# Patient Record
Sex: MALE | Race: WHITE | Employment: OTHER
[De-identification: names, ages, dates, MRNs, and addresses within clinical notes are randomized per-mention and may not be internally consistent; named-entity substitution may affect disease eponyms.]

---

## 2020-08-05 ENCOUNTER — HOSPITAL ENCOUNTER (OUTPATIENT)
Dept: GENERAL RADIOLOGY | Facility: CLINIC | Age: 60
Discharge: HOME | End: 2020-08-07
Payer: COMMERCIAL

## 2020-08-05 ENCOUNTER — HOSPITAL ENCOUNTER (OUTPATIENT)
Facility: CLINIC | Age: 60
Discharge: HOME | End: 2020-08-07
Payer: COMMERCIAL

## 2020-08-05 ENCOUNTER — OFFICE VISIT (OUTPATIENT)
Dept: INTERNAL MEDICINE CLINIC | Age: 60
End: 2020-08-05
Payer: COMMERCIAL

## 2020-08-05 VITALS
HEIGHT: 73 IN | BODY MASS INDEX: 33.53 KG/M2 | OXYGEN SATURATION: 96 % | DIASTOLIC BLOOD PRESSURE: 88 MMHG | SYSTOLIC BLOOD PRESSURE: 136 MMHG | HEART RATE: 75 BPM | WEIGHT: 253 LBS | TEMPERATURE: 98 F

## 2020-08-05 PROBLEM — Z87.891 EX-SMOKER: Status: ACTIVE | Noted: 2020-08-05

## 2020-08-05 PROBLEM — Z86.79 HISTORY OF UNSTABLE ANGINA: Status: ACTIVE | Noted: 2020-08-05

## 2020-08-05 PROBLEM — M79.18 MYOFASCIAL PAIN: Status: ACTIVE | Noted: 2020-08-05

## 2020-08-05 PROBLEM — E66.09 CLASS 1 OBESITY DUE TO EXCESS CALORIES WITHOUT SERIOUS COMORBIDITY WITH BODY MASS INDEX (BMI) OF 33.0 TO 33.9 IN ADULT: Status: ACTIVE | Noted: 2020-08-05

## 2020-08-05 PROBLEM — M25.511 ACUTE PAIN OF RIGHT SHOULDER: Status: ACTIVE | Noted: 2020-08-05

## 2020-08-05 PROCEDURE — 73030 X-RAY EXAM OF SHOULDER: CPT

## 2020-08-05 PROCEDURE — 72040 X-RAY EXAM NECK SPINE 2-3 VW: CPT

## 2020-08-05 PROCEDURE — 99204 OFFICE O/P NEW MOD 45 MIN: CPT | Performed by: INTERNAL MEDICINE

## 2020-08-05 RX ORDER — ATORVASTATIN CALCIUM 10 MG/1
10 TABLET, FILM COATED ORAL DAILY
COMMUNITY
End: 2020-08-05 | Stop reason: SDUPTHER

## 2020-08-05 RX ORDER — CYCLOBENZAPRINE HCL 10 MG
10 TABLET ORAL NIGHTLY PRN
Qty: 10 TABLET | Refills: 0 | Status: SHIPPED | OUTPATIENT
Start: 2020-08-05 | End: 2020-08-15

## 2020-08-05 RX ORDER — ASPIRIN 81 MG/1
81 TABLET ORAL DAILY
Qty: 90 TABLET | Refills: 1 | Status: SHIPPED | OUTPATIENT
Start: 2020-08-05 | End: 2020-11-03

## 2020-08-05 RX ORDER — DILTIAZEM HYDROCHLORIDE 120 MG/1
120 CAPSULE, EXTENDED RELEASE ORAL DAILY
COMMUNITY
End: 2020-10-05 | Stop reason: SDUPTHER

## 2020-08-05 RX ORDER — NAPROXEN 500 MG/1
500 TABLET ORAL 2 TIMES DAILY WITH MEALS
COMMUNITY
End: 2020-08-05

## 2020-08-05 RX ORDER — ATORVASTATIN CALCIUM 10 MG/1
10 TABLET, FILM COATED ORAL DAILY
Qty: 30 TABLET | Refills: 3 | Status: SHIPPED | OUTPATIENT
Start: 2020-08-05 | End: 2020-11-03 | Stop reason: SDUPTHER

## 2020-08-05 RX ORDER — PREDNISONE 20 MG/1
20 TABLET ORAL DAILY
Qty: 7 TABLET | Refills: 0 | Status: SHIPPED | OUTPATIENT
Start: 2020-08-05 | End: 2020-08-12

## 2020-08-05 ASSESSMENT — PATIENT HEALTH QUESTIONNAIRE - PHQ9
SUM OF ALL RESPONSES TO PHQ QUESTIONS 1-9: 0
1. LITTLE INTEREST OR PLEASURE IN DOING THINGS: 0
SUM OF ALL RESPONSES TO PHQ QUESTIONS 1-9: 0
2. FEELING DOWN, DEPRESSED OR HOPELESS: 0
SUM OF ALL RESPONSES TO PHQ9 QUESTIONS 1 & 2: 0

## 2020-08-05 ASSESSMENT — ENCOUNTER SYMPTOMS
TROUBLE SWALLOWING: 0
COUGH: 0
EYE DISCHARGE: 0
BLOOD IN STOOL: 0
WHEEZING: 0
SHORTNESS OF BREATH: 0
ABDOMINAL DISTENTION: 0
COLOR CHANGE: 0
EYE PAIN: 0
DIARRHEA: 0

## 2020-08-05 NOTE — PROGRESS NOTES
[address] [address]  Dept: [phone]  Dept Fax: [phone]    Baltazar Dorsey is a 61 y.o. male who presents today for his medical conditions/complaintsas noted below. Baltazar Dorsey is c/o of   Chief Complaint   Patient presents with    New Patient    Shoulder Pain     right shoulder          HPI:     New pt  To town  Right shooudler hand scapula pain  For aw week  Throbbing  Worse  No relation to movenet of arm or shoulder  No hx of neck or shoulder issues  Right handed     HLD  Onset more than 5 years ago  Severity is mild, not getting worse  Not associated with pancreatitis  Tolerating statin well no muscle pain        No results found for: LABA1C          ( goal A1Cis < 7)   No results found for: LABMICR  No results found for: LDLCHOLESTEROL, LDLCALC    (goal LDL is <100)   No results found for: AST, ALT, BUN  BP Readings from Last 3 Encounters:   08/05/20 136/88          (goal 120/80)    No past medical history on file. No past surgical history on file. No family history on file. Social History     Tobacco Use    Smoking status: Never Smoker    Smokeless tobacco: Never Used   Substance Use Topics    Alcohol use: Yes      Current Outpatient Medications   Medication Sig Dispense Refill    dilTIAZem (DILT-XR) 120 MG extended release capsule Take 120 mg by mouth daily      atorvastatin (LIPITOR) 10 MG tablet Take 1 tablet by mouth daily 30 tablet 3    aspirin EC 81 MG EC tablet Take 1 tablet by mouth daily 90 tablet 1    predniSONE (DELTASONE) 20 MG tablet Take 1 tablet by mouth daily for 7 days 7 tablet 0    cyclobenzaprine (FLEXERIL) 10 MG tablet Take 1 tablet by mouth nightly as needed for Muscle spasms 10 tablet 0     No current facility-administered medications for this visit.       No Known Allergies    Health Maintenance   Topic Date Due    Hepatitis C screen  1960    Lipid screen  07/22/1970    HIV screen  07/22/1975    DTaP/Tdap/Td vaccine (1 - Tdap) 07/22/1979    Diabetes screen  07/22/2000    Shingles Vaccine (1 of 2) 07/22/2010    Colon cancer screen colonoscopy  07/22/2010    Flu vaccine (1) 09/01/2020    Hepatitis A vaccine  Aged Out    Hepatitis B vaccine  Aged Out    Hib vaccine  Aged Out    Meningococcal (ACWY) vaccine  Aged Out    Pneumococcal 0-64 years Vaccine  Aged Out       Subjective:     Review of Systems   Constitutional: Negative for appetite change, diaphoresis and fatigue. HENT: Negative for ear discharge and trouble swallowing. Eyes: Negative for pain and discharge. Respiratory: Negative for cough, shortness of breath and wheezing. Cardiovascular: Negative for chest pain and palpitations. Gastrointestinal: Negative for abdominal distention, blood in stool and diarrhea. Endocrine: Negative for polydipsia and polyphagia. Genitourinary: Negative for difficulty urinating and frequency. Musculoskeletal: Positive for arthralgias, myalgias and neck pain. Negative for gait problem. Skin: Negative for color change and rash. Allergic/Immunologic: Negative for environmental allergies and food allergies. Neurological: Negative for dizziness and headaches. Hematological: Negative for adenopathy. Does not bruise/bleed easily. Psychiatric/Behavioral: Negative for behavioral problems and sleep disturbance. Objective:     Physical Exam  Constitutional:       Appearance: He is well-developed. He is not diaphoretic. HENT:      Head: Normocephalic and atraumatic. Eyes:      General:         Right eye: No discharge. Left eye: No discharge. Extraocular Movements:      Right eye: Normal extraocular motion. Left eye: Normal extraocular motion. Conjunctiva/sclera: Conjunctivae normal.      Right eye: Right conjunctiva is not injected. Left eye: Left conjunctiva is not injected.    Neck:      Musculoskeletal: Normal range of motion and neck with body mass index (BMI) of 33.0 to 33.9 in adult     4. History of unstable angina  Lipid, Fasting    Comprehensive Metabolic Panel    CBC With Auto Differential   5. Ex-smoker  CT lung screen [Initial/Annual]   6. Myofascial pain               Plan:      Return in about 2 weeks (around 8/19/2020). Orders Placed This Encounter   Procedures    XR CERVICAL SPINE (2-3 VIEWS)     Standing Status:   Future     Standing Expiration Date:   8/5/2021     Order Specific Question:   Reason for exam:     Answer:   neck pain    XR SHOULDER RIGHT (MIN 2 VIEWS)     Standing Status:   Future     Standing Expiration Date:   8/5/2021     Order Specific Question:   Reason for exam:     Answer:   shoulder neck pain    CT lung screen [Initial/Annual]     Age: 61 y.o. Smoking History:   Social History    Tobacco Use      Smoking status: Never Smoker      Smokeless tobacco: Never Used    Alcohol use: Yes    Drug use: Never    Pack years: 0  Last CT lung screen: [unfilled]     Standing Status:   Future     Standing Expiration Date:   9/5/2020     Order Specific Question:   Is there documentation of shared decision making? Answer:   Yes     Order Specific Question:   Does the patient show any signs or symptoms of lung cancer? Answer:   No     Order Specific Question:   Is this the first (baseline) CT or an annual exam?     Answer:   Baseline [1]     Order Specific Question:   Is this a low dose CT or a routine CT? Answer:   Low Dose CT [1]     Order Specific Question:   Smoking Status? Answer: Former Smoker [4]     Order Specific Question:   Date quit smoking? (must be within 15 years)     Answer:   8/5/2010     Order Specific Question:   Smoking packs per day? Answer:   1     Order Specific Question:   Years smoking?      Answer:   15    Lipid, Fasting     Standing Status:   Future     Standing Expiration Date:   8/4/2021    Comprehensive Metabolic Panel     Standing Status:   Future     Standing Expiration

## 2020-08-06 LAB
ABSOLUTE BASO #: 0 X10E9/L (ref 0–0.2)
ABSOLUTE EOS #: 0.2 X10E9/L (ref 0–0.4)
ABSOLUTE LYMPH #: 1.7 X10E9/L (ref 1–3.5)
ABSOLUTE MONO #: 0.8 X10E9/L (ref 0–0.9)
ABSOLUTE NEUT #: 3.6 X10E9/L (ref 1.5–6.6)
ALBUMIN SERPL-MCNC: 4.4 G/DL
ALBUMIN SERPL-MCNC: 4.4 G/DL (ref 3.2–5.3)
ALK PHOSPHATASE: 141 U/L (ref 39–130)
ALP BLD-CCNC: 141 U/L
ALT SERPL-CCNC: 36 U/L
ALT SERPL-CCNC: 36 U/L (ref 0–40)
ANION GAP SERPL CALCULATED.3IONS-SCNC: 11 MMOL/L
ANION GAP SERPL CALCULATED.3IONS-SCNC: 11 MMOL/L (ref 5–15)
AST SERPL-CCNC: 27 U/L
AST SERPL-CCNC: 27 U/L (ref 0–41)
BASOPHILS ABSOLUTE: 0 /ΜL
BASOPHILS RELATIVE PERCENT: 0.6 %
BASOPHILS RELATIVE PERCENT: 0.6 %
BILIRUB SERPL-MCNC: 0.6 MG/DL (ref 0.1–1.4)
BILIRUB SERPL-MCNC: 0.6 MG/DL (ref 0.3–1.2)
BUN BLDV-MCNC: 21 MG/DL
BUN BLDV-MCNC: 21 MG/DL (ref 5–23)
CALCIUM SERPL-MCNC: 9.2 MG/DL
CALCIUM SERPL-MCNC: 9.2 MG/DL (ref 8.5–10.5)
CHLORIDE BLD-SCNC: 106 MMOL/L
CHLORIDE BLD-SCNC: 106 MMOL/L (ref 98–109)
CHOLESTEROL, FASTING: 186
CHOLESTEROL/HDL RATIO: 3.8 (ref 1–5)
CHOLESTEROL: 186 MG/DL (ref 150–200)
CO2: 23 MMOL/L
CO2: 23 MMOL/L (ref 22–32)
CREAT SERPL-MCNC: 1.07 MG/DL
CREAT SERPL-MCNC: 1.07 MG/DL (ref 0.6–1.3)
EGFR AFRICAN AMERICAN: >60 ML/MIN/1.73SQ.M
EGFR IF NONAFRICAN AMERICAN: >60 ML/MIN/1.73SQ.M
EOSINOPHILS ABSOLUTE: 0.2 /ΜL
EOSINOPHILS RELATIVE PERCENT: 3 %
EOSINOPHILS RELATIVE PERCENT: 3 %
GFR CALCULATED: ABNORMAL
GLUCOSE BLD-MCNC: 100 MG/DL
GLUCOSE: 100 MG/DL (ref 65–99)
HCT VFR BLD CALC: 46 % (ref 39–49)
HCT VFR BLD CALC: 46 % (ref 41–53)
HDLC SERPL-MCNC: 49 MG/DL
HDLC SERPL-MCNC: 49 MG/DL (ref 35–70)
HEMOGLOBIN: 16 G/DL (ref 13.5–17.5)
HEMOGLOBIN: 16 G/DL (ref 13–17)
LDL CHOLESTEROL CALCULATED: 100 MG/DL
LDL CHOLESTEROL CALCULATED: 100 MG/DL (ref 0–160)
LDL/HDL RATIO: 2
LYMPHOCYTE %: 27.5 %
LYMPHOCYTES ABSOLUTE: 1.7 /ΜL
LYMPHOCYTES RELATIVE PERCENT: 27.5 %
MCH RBC QN AUTO: 32.9 PG
MCH RBC QN AUTO: 32.9 PG (ref 27–34)
MCHC RBC AUTO-ENTMCNC: 34.9 G/DL
MCHC RBC AUTO-ENTMCNC: 34.9 G/DL (ref 32–36)
MCV RBC AUTO: 94 FL
MCV RBC AUTO: 94 FL (ref 80–100)
MONOCYTES # BLD: 11.9 %
MONOCYTES ABSOLUTE: 0.8 /ΜL
MONOCYTES RELATIVE PERCENT: 11.9 %
NEUTROPHILS ABSOLUTE: 3.6 /ΜL
NEUTROPHILS RELATIVE PERCENT: 57 %
NEUTROPHILS RELATIVE PERCENT: 57 %
PDW BLD-RTO: 13.5 %
PDW BLD-RTO: 13.5 % (ref 11.5–15)
PLATELET # BLD: 270 K/ΜL
PLATELETS: 270 X10E9/L (ref 150–450)
PMV BLD AUTO: 8.3 FL
PMV BLD AUTO: 8.3 FL (ref 7–12)
POTASSIUM SERPL-SCNC: 4.2 MMOL/L
POTASSIUM SERPL-SCNC: 4.2 MMOL/L (ref 3.5–5)
RBC # BLD: 4.88 10^6/ΜL
RBC: 4.88 X10E12/L (ref 4.1–5.7)
SODIUM BLD-SCNC: 140 MMOL/L
SODIUM BLD-SCNC: 140 MMOL/L (ref 134–146)
TOTAL PROTEIN: 7.5
TOTAL PROTEIN: 7.5 G/DL (ref 6–8)
TRIGL SERPL-MCNC: 185 MG/DL (ref 27–150)
TRIGLYCERIDE, FASTING: 185
VLDLC SERPL CALC-MCNC: 37 MG/DL (ref 0–30)
WBC # BLD: 6.4 10^3/ML
WBC: 6.4 X10E9/L (ref 4–11)

## 2020-08-11 ENCOUNTER — HOSPITAL ENCOUNTER (OUTPATIENT)
Dept: PHYSICAL THERAPY | Facility: CLINIC | Age: 60
Setting detail: THERAPIES SERIES
Discharge: HOME | End: 2020-08-11
Payer: COMMERCIAL

## 2020-08-11 PROCEDURE — 97110 THERAPEUTIC EXERCISES: CPT

## 2020-08-11 PROCEDURE — 97161 PT EVAL LOW COMPLEX 20 MIN: CPT

## 2020-08-11 PROCEDURE — 97140 MANUAL THERAPY 1/> REGIONS: CPT

## 2020-08-11 NOTE — CONSULTS
[] [facility] &  Therapy  [address]  [phone]  F: [phone] [] [address]  [facility]   [address]  P: [phone]  F: [phone] [x] [address] &  Therapy  [address] [facility]  P: [phone]  F: [phone] [] [address]  [facility]   [address]   [place]: [phone]  F: [phone]      Physical Therapy Upper Extremity Evaluation    Date:  2020  Patient: Baltazar Dorsey  : 1960  MRN: [id number]  Physician: Dr. Lexi Leblanc: Irene Sepulveda (unlimited visits) Recheck insurance verification   Medical Diagnosis: Acute pain R shoulder  Rehab Codes: M25.511  Onset Date: ~20   Next 's appt: TBA    Subjective:   CC: Pt arrives stating that pain is completing gone currently. Pain did last over a week, sharp pain in posterior shoulder causing numbness down into R arm. PCP took an xray and stated that pain was originating from neck. Currently taking prenisdone  HPI: (onset date)    PMHx: [] Unremarkable [] Diabetes [] HTN  [] Pacemaker   [] MI/Heart Problems [] Cancer [] Arthritis [x] Other:              [] Refer to full medical chart  In EPIC       Comorbidities:   [] Obesity [] Dialysis  [] Other:   [] Asthma/COPD [] Dementia [] Other:   [] Stroke [] Sleep apnea [] Other:   [] Vascular disease [] Rheumatic disease [] Other:     Tests: [x] X-Ray:   Multilevel degenerative disc disease in the cervical spine with multilevel    facet arthropathy.         No acute osseous abnormality of the right shoulder.  AC degenerative changes. [] MRI:  [] Other:    Medications: [x] Refer to full medical record [] None [] Other:  Allergies:      [x] Refer to full medical record [] None [] Other:    Function:  Hand Dominance  [] Right  [] Left  Patient lives with:     In allergic. Frequency: 2 x/week for 10 visits    Todays Treatment:    Exercise  R shoulder, upper back pain Reps/ Time Weight/ Level Comments   UBE            *UT stretch 3x30\"     *Levator stretch 3x30\"     *Mid back stretch 3x30\"     *Foam roller upper back stretch x     Other:  *HEP    Manual: DI to R levator  FRSL C4/C5- MET    Specific Instructions for next treatment: Pt will continue HEP for 2 weeks d/t no pain, and will be reassessed at two week erika to see if continued visits are needed. Evaluation Complexity:  History (Personal factors, comorbidities) [x] 0 [] 1-2 [] 3+   Exam (limitations, restrictions) [x] 1-2 [] 3 [] 4+   Clinical presentation (progression) [x] Stable [] Evolving  [] Unstable   Decision Making [x] Low [] Moderate [] High    [x] Low Complexity [] Moderate Complexity [] High Complexity       Treatment Charges: Mins Units   [x] Evaluation       [x]  Low       []  Moderate       []  High 20 1   []  Modalities     [x]  Ther Exercise 15 1   [x]  Manual Therapy 15 1   []  Ther Activities     []  Aquatics     []  Vasocompression     []  Other       TOTAL TREATMENT TIME: 50 minutes    Time in: 1100   Time Out: 1150    Electronically signed by: Shen Hanley PT        Physician Signature:________________________________Date:__________________  By signing above or cosigning this note, I have reviewed this plan of care and certify a need for medically necessary rehabilitation services.      *PLEASE SIGN ABOVE AND FAX BACK ALL PAGES*

## 2020-08-19 ENCOUNTER — OFFICE VISIT (OUTPATIENT)
Dept: INTERNAL MEDICINE CLINIC | Age: 60
End: 2020-08-19
Payer: COMMERCIAL

## 2020-08-19 VITALS
HEART RATE: 73 BPM | BODY MASS INDEX: 33.4 KG/M2 | OXYGEN SATURATION: 96 % | HEIGHT: 73 IN | DIASTOLIC BLOOD PRESSURE: 80 MMHG | WEIGHT: 252 LBS | SYSTOLIC BLOOD PRESSURE: 136 MMHG | TEMPERATURE: 98.2 F

## 2020-08-19 PROCEDURE — 99214 OFFICE O/P EST MOD 30 MIN: CPT | Performed by: INTERNAL MEDICINE

## 2020-08-19 ASSESSMENT — ENCOUNTER SYMPTOMS
EYE DISCHARGE: 0
DIARRHEA: 0
WHEEZING: 0
BLOOD IN STOOL: 0
COUGH: 0
COLOR CHANGE: 0
ABDOMINAL DISTENTION: 0
SHORTNESS OF BREATH: 0
TROUBLE SWALLOWING: 0
EYE PAIN: 0
BACK PAIN: 1

## 2020-08-19 NOTE — PROGRESS NOTES
[address] [address]  Dept: [phone]  Dept Fax: [phone]    Natalia Crouch is a 61 y.o. male who presents today for his medical conditions/complaintsas noted below. Natalia Crouch is c/o of   Chief Complaint   Patient presents with    Shoulder Pain     right shoulder pain follow up          HPI:     sholder pain back pain is better          No results found for: LABA1C          ( goal A1Cis < 7)   No results found for: LABMICR  LDL Calculated (mg/dL)   Date Value   08/06/2020 100   08/06/2020 100       (goal LDL is <100)   AST (U/L)   Date Value   08/06/2020 27     ALT (U/L)   Date Value   08/06/2020 36     BUN (mg/dL)   Date Value   08/06/2020 21     BP Readings from Last 3 Encounters:   08/19/20 136/80   08/05/20 136/88          (goal 120/80)    No past medical history on file. No past surgical history on file. No family history on file. Social History     Tobacco Use    Smoking status: Never Smoker    Smokeless tobacco: Never Used   Substance Use Topics    Alcohol use: Yes      Current Outpatient Medications   Medication Sig Dispense Refill    dilTIAZem (DILT-XR) 120 MG extended release capsule Take 120 mg by mouth daily      atorvastatin (LIPITOR) 10 MG tablet Take 1 tablet by mouth daily 30 tablet 3    aspirin EC 81 MG EC tablet Take 1 tablet by mouth daily 90 tablet 1     No current facility-administered medications for this visit.       No Known Allergies    Health Maintenance   Topic Date Due    Hepatitis C screen  1960    HIV screen  07/22/1975    DTaP/Tdap/Td vaccine (1 - Tdap) 07/22/1979    Shingles Vaccine (1 of 2) 07/22/2010    Colon cancer screen colonoscopy  07/22/2010    Flu vaccine (1) 09/01/2020    Lipid screen  08/06/2021    Hepatitis A vaccine  Aged Out    Hepatitis B vaccine  Aged Out    Hib vaccine  Aged Out    Meningococcal (ACWY) vaccine  Aged Out    Pneumococcal 0-64 years Vaccine  Aged Harrells orders of the defined types were placed in this encounter. cdt lugn screen pending  shoudler and back is better     Patient given educational materials - see patient instructions. Discussed use, benefit, and side effects of prescribed medications. All patientquestions answered. Pt voiced understanding. Reviewed health maintenance. Instructedto continue current medications, diet and exercise. Patient agreed with treatmentplan. Follow up as directed.      Electronically signed by Freddy Vargas MD on 8/19/2020 at 11:04 AM

## 2020-08-24 ENCOUNTER — TELEPHONE (OUTPATIENT)
Dept: INTERNAL MEDICINE CLINIC | Age: 60
End: 2020-08-24

## 2020-08-24 ENCOUNTER — HOSPITAL ENCOUNTER (OUTPATIENT)
Dept: PHYSICAL THERAPY | Facility: CLINIC | Age: 60
Setting detail: THERAPIES SERIES
Discharge: HOME | End: 2020-08-24
Payer: COMMERCIAL

## 2020-08-24 PROCEDURE — 97110 THERAPEUTIC EXERCISES: CPT

## 2020-08-24 PROCEDURE — 97140 MANUAL THERAPY 1/> REGIONS: CPT

## 2020-08-24 NOTE — FLOWSHEET NOTE
[]  Modalities     [x]  Ther Exercise 15 1   [x]  Manual Therapy 15 1   []  Ther Activities     []  Aquatics     []  Vasocompression     []  Other     Total Treatment time 30 2       Assessment: [x] Progressing toward goals. Pt able to demonstrate proper HEP, as well as remains painfree throughout. [] No change. [] Other:    Patient would benefit from skilled physical therapy services in order to:  Decrease neck and shoulder tightness     Problems:    [x]? ? Pain:  [x]? ? ROM:  [x]? ? Strength:  [x]? ? Function:  []? Other:       STG: (to be met in 10 treatments)  1. ? Pain: Pt to have continued 0/10 pain MET  2. ? Function: Pt to report sleeping through the night without  pain MET  3. Patient to be independent with home exercise program as demonstrated by performance with correct form without cues. MET    Pt. Education:  [x] Yes  [] No  [] Reviewed Prior HEP/Ed  Method of Education: [x] Verbal  [x] Demo  [x] Written  Comprehension of Education:  [x] Verbalizes understanding. [x] Demonstrates understanding. [x] Needs review. [] Demonstrates/verbalizes HEP/Ed previously given. Plan: [x] Pt to continue as HEP, will keep chart open until 9/24 in case pt's symptom's reoccur. If pt does not call during that time will DC chart.          Time In: 1100            Time Out: 1135    Electronically signed by:  Marcelino Mullins, PT

## 2020-08-24 NOTE — TELEPHONE ENCOUNTER
Radiology called and stated that patient doesn't qualify for ct lung screening for smoking because he didn't smoke for 30 years. She said you can order a ct chest low dose diagnostic and that would be covered.

## 2020-09-01 ENCOUNTER — HOSPITAL ENCOUNTER (OUTPATIENT)
Dept: CT IMAGING | Age: 60
Discharge: HOME | End: 2020-09-03
Payer: COMMERCIAL

## 2020-09-01 PROCEDURE — 71250 CT THORAX DX C-: CPT

## 2020-10-05 RX ORDER — DILTIAZEM HYDROCHLORIDE 120 MG/1
120 CAPSULE, EXTENDED RELEASE ORAL 3 TIMES DAILY
Qty: 90 CAPSULE | Refills: 3 | Status: SHIPPED | OUTPATIENT
Start: 2020-10-05 | End: 2021-01-21 | Stop reason: SDUPTHER

## 2020-10-05 NOTE — TELEPHONE ENCOUNTER
Medication: Diiltiazem  Last visit: 8/19/20  Next visit: 11/19/2020  Last refill: Not filled by our office before  Pharmacy: [address] [place] [address]

## 2020-11-03 RX ORDER — ATORVASTATIN CALCIUM 10 MG/1
10 TABLET, FILM COATED ORAL DAILY
Qty: 30 TABLET | Refills: 3 | Status: SHIPPED | OUTPATIENT
Start: 2020-11-03 | End: 2021-04-08

## 2020-11-03 RX ORDER — HYDROGEN PEROXIDE 2.65 ML/100ML
LIQUID ORAL; TOPICAL
Qty: 90 TABLET | Refills: 0 | Status: SHIPPED | OUTPATIENT
Start: 2020-11-03 | End: 2021-01-21 | Stop reason: SDUPTHER

## 2020-11-03 NOTE — TELEPHONE ENCOUNTER
Pt would like a refill on the two pending medications and an additional medication that is not listed in the med review. Pt says he asked his doctor to fill it for him the last time he was seen, but it was not sent over to his pharmacy.      Last OV: 8/19  Next OV: 11/19    Pt contact: [phone]

## 2021-01-21 RX ORDER — DILTIAZEM HYDROCHLORIDE 120 MG/1
120 CAPSULE, EXTENDED RELEASE ORAL 3 TIMES DAILY
Qty: 90 CAPSULE | Refills: 3 | Status: SHIPPED | OUTPATIENT
Start: 2021-01-21 | End: 2021-06-16 | Stop reason: SDUPTHER

## 2021-01-21 RX ORDER — ASPIRIN 81 MG/1
81 TABLET ORAL DAILY
Qty: 90 TABLET | Refills: 0 | Status: SHIPPED | OUTPATIENT
Start: 2021-01-21 | End: 2022-04-01

## 2021-04-08 RX ORDER — ATORVASTATIN CALCIUM 10 MG/1
TABLET, FILM COATED ORAL
Qty: 30 TABLET | Refills: 0 | Status: ON HOLD | OUTPATIENT
Start: 2021-04-08 | End: 2021-11-09 | Stop reason: HOSPADM

## 2021-06-14 RX ORDER — NAPROXEN 500 MG/1
500 TABLET ORAL 2 TIMES DAILY WITH MEALS
Qty: 60 TABLET | Refills: 1 | OUTPATIENT
Start: 2021-06-14

## 2021-06-14 RX ORDER — DILTIAZEM HYDROCHLORIDE 120 MG/1
120 CAPSULE, EXTENDED RELEASE ORAL 3 TIMES DAILY
Qty: 90 CAPSULE | Refills: 3 | OUTPATIENT
Start: 2021-06-14 | End: 2021-07-14

## 2021-06-14 RX ORDER — CYCLOBENZAPRINE HCL 10 MG
10 TABLET ORAL NIGHTLY PRN
Qty: 10 TABLET | Refills: 0 | Status: CANCELLED | OUTPATIENT
Start: 2021-06-14 | End: 2021-06-24

## 2021-06-16 ENCOUNTER — OFFICE VISIT (OUTPATIENT)
Dept: INTERNAL MEDICINE CLINIC | Age: 61
End: 2021-06-16
Payer: COMMERCIAL

## 2021-06-16 VITALS
BODY MASS INDEX: 33.53 KG/M2 | DIASTOLIC BLOOD PRESSURE: 88 MMHG | HEIGHT: 73 IN | WEIGHT: 253 LBS | HEART RATE: 81 BPM | SYSTOLIC BLOOD PRESSURE: 138 MMHG | OXYGEN SATURATION: 98 %

## 2021-06-16 DIAGNOSIS — Z12.11 SCREENING FOR MALIGNANT NEOPLASM OF COLON: ICD-10-CM

## 2021-06-16 DIAGNOSIS — Z86.79 HISTORY OF UNSTABLE ANGINA: ICD-10-CM

## 2021-06-16 DIAGNOSIS — E78.2 MIXED HYPERLIPIDEMIA: Primary | ICD-10-CM

## 2021-06-16 DIAGNOSIS — M54.50 CHRONIC MIDLINE LOW BACK PAIN WITHOUT SCIATICA: ICD-10-CM

## 2021-06-16 DIAGNOSIS — Z87.891 EX-SMOKER: ICD-10-CM

## 2021-06-16 DIAGNOSIS — G89.29 CHRONIC MIDLINE LOW BACK PAIN WITHOUT SCIATICA: ICD-10-CM

## 2021-06-16 PROBLEM — M54.9 CHRONIC BACK PAIN: Status: ACTIVE | Noted: 2021-06-16

## 2021-06-16 PROCEDURE — 99214 OFFICE O/P EST MOD 30 MIN: CPT | Performed by: INTERNAL MEDICINE

## 2021-06-16 RX ORDER — DILTIAZEM HYDROCHLORIDE 120 MG/1
120 CAPSULE, EXTENDED RELEASE ORAL 3 TIMES DAILY
Qty: 90 CAPSULE | Refills: 3 | Status: SHIPPED | OUTPATIENT
Start: 2021-06-16 | End: 2021-10-13 | Stop reason: SDUPTHER

## 2021-06-16 RX ORDER — NAPROXEN 500 MG/1
500 TABLET ORAL 2 TIMES DAILY PRN
Qty: 60 TABLET | Refills: 0 | Status: SHIPPED | OUTPATIENT
Start: 2021-06-16 | End: 2021-11-16

## 2021-06-16 ASSESSMENT — PATIENT HEALTH QUESTIONNAIRE - PHQ9
SUM OF ALL RESPONSES TO PHQ QUESTIONS 1-9: 0
SUM OF ALL RESPONSES TO PHQ QUESTIONS 1-9: 0
2. FEELING DOWN, DEPRESSED OR HOPELESS: 0
1. LITTLE INTEREST OR PLEASURE IN DOING THINGS: 0
SUM OF ALL RESPONSES TO PHQ QUESTIONS 1-9: 0
SUM OF ALL RESPONSES TO PHQ9 QUESTIONS 1 & 2: 0

## 2021-06-16 ASSESSMENT — ENCOUNTER SYMPTOMS
EYE PAIN: 0
DIARRHEA: 0
SHORTNESS OF BREATH: 0
WHEEZING: 0
ABDOMINAL DISTENTION: 0
EYE DISCHARGE: 0
TROUBLE SWALLOWING: 0
BLOOD IN STOOL: 0
COLOR CHANGE: 0
BACK PAIN: 1
COUGH: 0

## 2021-06-16 NOTE — PROGRESS NOTES
Visit Information    Have you changed or started any medications since your last visit including any over-the-counter medicines, vitamins, or herbal medicines? no   Are you having any side effects from any of your medications? -  no  Have you stopped taking any of your medications? Is so, why? -  no    Have you seen any other physician or provider since your last visit? No  Have you had any other diagnostic tests since your last visit? No  Have you been seen in the emergency room and/or had an admission to a hospital since we last saw you? No  Have you had your routine dental cleaning in the past 6 months? yes -     Have you activated your [employer] account? If not, what are your barriers?  Yes     Patient Care Team:  Rosemary Vila MD as PCP - General (Internal Medicine)  Rosemary Vila MD as PCP - [facility]    Medical History Review  Past Medical, Family, and Social History reviewed and does not contribute to the patient presenting condition    Health Maintenance   Topic Date Due    Hepatitis C screen  Never done    COVID-19 Vaccine (1) Never done    HIV screen  Never done    DTaP/Tdap/Td vaccine (1 - Tdap) Never done    Shingles Vaccine (1 of 2) Never done    Colon cancer screen colonoscopy  Never done    Lipid screen  08/06/2021    Flu vaccine (Season Ended) 09/01/2021    Hepatitis A vaccine  Aged Out    Hepatitis B vaccine  Aged Out    Hib vaccine  Aged Out    Meningococcal (ACWY) vaccine  Aged Out    Pneumococcal 0-64 years Vaccine  Aged Out

## 2021-06-16 NOTE — PROGRESS NOTES
[address] [address]  Dept: [phone]  Dept Fax: [phone]    Angelica Palomo is a 61 y.o. male who presents today for his medical conditions/complaintsas noted below. Angelica Palomo is c/o of   Chief Complaint   Patient presents with    Medication Refill    Back Pain         HPI:     HLD  Onset more than 5 years ago  Severity is mild, not getting worse  Not associated with pancreatitis  Tolerating statin well no muscle pain  Back pain  Onset more than 1year ago  Severity is moderate to severe  Not associated wt lossbut associated with radiation of pain on the legs  No bowel bladder incontinence   Aggravated with bending and better with pain meds and rest.  Controlled with current pain meds. No results found for: LABA1C          ( goal A1Cis < 7)   No results found for: LABMICR  LDL Calculated (mg/dL)   Date Value   08/06/2020 100   08/06/2020 100       (goal LDL is <100)   AST (U/L)   Date Value   08/06/2020 27     ALT (U/L)   Date Value   08/06/2020 36     BUN (mg/dL)   Date Value   08/06/2020 21     BP Readings from Last 3 Encounters:   06/16/21 138/88   08/19/20 136/80   08/05/20 136/88          (goal 120/80)    No past medical history on file. No past surgical history on file. No family history on file.     Social History     Tobacco Use    Smoking status: Never Smoker    Smokeless tobacco: Never Used   Substance Use Topics    Alcohol use: Yes      Current Outpatient Medications   Medication Sig Dispense Refill    dilTIAZem (DILT-XR) 120 MG extended release capsule Take 1 capsule by mouth three times daily 90 capsule 3    naproxen (NAPROSYN) 500 MG tablet Take 1 tablet by mouth 2 times daily as needed for Pain 60 tablet 0    aspirin (EQ ASPIRIN ADULT LOW DOSE) 81 MG EC tablet Take 1 tablet by mouth daily 90 tablet 0    atorvastatin (LIPITOR) 10 MG tablet Take 1 tablet by mouth once daily (Patient not taking: Reported on 6/16/2021) 30 tablet 0     No current facility-administered medications for this visit. No Known Allergies    Health Maintenance   Topic Date Due    Hepatitis C screen  Never done    COVID-19 Vaccine (1) Never done    HIV screen  Never done    DTaP/Tdap/Td vaccine (1 - Tdap) Never done    Shingles Vaccine (1 of 2) Never done    Colon cancer screen colonoscopy  Never done    Lipid screen  08/06/2021    Flu vaccine (Season Ended) 09/01/2021    Hepatitis A vaccine  Aged Out    Hepatitis B vaccine  Aged Out    Hib vaccine  Aged Out    Meningococcal (ACWY) vaccine  Aged Out    Pneumococcal 0-64 years Vaccine  Aged Out       Subjective:     Review of Systems   Constitutional: Negative for appetite change, diaphoresis and fatigue. HENT: Negative for ear discharge and trouble swallowing. Eyes: Negative for pain and discharge. Respiratory: Negative for cough, shortness of breath and wheezing. Cardiovascular: Negative for chest pain and palpitations. Gastrointestinal: Negative for abdominal distention, blood in stool and diarrhea. Endocrine: Negative for polydipsia and polyphagia. Genitourinary: Negative for difficulty urinating and frequency. Musculoskeletal: Positive for arthralgias and back pain. Negative for gait problem, myalgias and neck pain. Skin: Negative for color change and rash. Allergic/Immunologic: Negative for environmental allergies and food allergies. Neurological: Negative for dizziness and headaches. Hematological: Negative for adenopathy. Does not bruise/bleed easily. Psychiatric/Behavioral: Negative for behavioral problems and sleep disturbance. Objective:     Physical Exam  Constitutional:       Appearance: He is well-developed. He is not diaphoretic. HENT:      Head: Normocephalic and atraumatic. Eyes:      General:         Right eye: No discharge. Left eye: No discharge. Extraocular Movements:      Right eye: Normal extraocular motion. Left eye: Normal extraocular motion. Conjunctiva/sclera: Conjunctivae normal.      Right eye: Right conjunctiva is not injected. Left eye: Left conjunctiva is not injected. Neck:      Thyroid: No thyroid mass or thyromegaly. Vascular: No JVD. Cardiovascular:      Rate and Rhythm: Normal rate and regular rhythm. Heart sounds: No murmur heard. No friction rub. Pulmonary:      Effort: Pulmonary effort is normal. No tachypnea, bradypnea, accessory muscle usage or respiratory distress. Breath sounds: Normal breath sounds. No wheezing or rales. Abdominal:      General: Bowel sounds are normal. There is no distension. Palpations: Abdomen is soft. Tenderness: There is no abdominal tenderness. There is no rebound. Musculoskeletal:         General: No tenderness. Normal range of motion. Cervical back: Normal range of motion and neck supple. No edema or erythema. Lymphadenopathy:      Head:      Right side of head: No submental or submandibular adenopathy. Left side of head: No submental or submandibular adenopathy. Cervical: No cervical adenopathy. Skin:     General: Skin is warm. Coloration: Skin is not pale. Findings: No bruising, ecchymosis or rash. Neurological:      Mental Status: He is alert and oriented to person, place, and time. Cranial Nerves: No cranial nerve deficit. Sensory: No sensory deficit. Motor: No atrophy or abnormal muscle tone. Coordination: Coordination normal.   Psychiatric:         Mood and Affect: Mood is not anxious. Affect is not angry. Speech: Speech is not slurred. Behavior: Behavior normal. Behavior is not aggressive. Thought Content: Thought content does not include homicidal ideation. Cognition and Memory: Memory is not impaired.        /88   Pulse 81   Ht 6' 1\" (1.854 m)   Wt 253 lb (114.8 kg)   SpO2 98%   BMI 33.38 kg/m²     Assessment: Diagnosis Orders   1. Mixed hyperlipidemia  Lipid Panel    Hemoglobin A1C    Comprehensive Metabolic Panel    CBC Auto Differential   2. Screening for malignant neoplasm of colon  Cologuard (For External Results Only)   3. Ex-smoker     4. History of unstable angina  Hemoglobin A1C   5. Chronic midline low back pain without sciatica  Ambulatory referral to Physical Therapy             Plan:      No follow-ups on file. Orders Placed This Encounter   Procedures    Cologuard (For External Results Only)     This test is performed by an external laboratory and is used for result attachment only. It is required that this order requisition be faxed to: [facility] @ [phone]. See [email or web address] for further information.      Standing Status:   Future     Standing Expiration Date:   6/16/2022    Lipid Panel     Standing Status:   Future     Standing Expiration Date:   9/14/2021     Order Specific Question:   Is Patient Fasting?/# of Hours     Answer:   10 to 12    Hemoglobin A1C     Standing Status:   Future     Standing Expiration Date:   9/14/2021    Comprehensive Metabolic Panel     Standing Status:   Future     Standing Expiration Date:   9/14/2021    CBC Auto Differential     Standing Status:   Future     Standing Expiration Date:   9/14/2021    Ambulatory referral to Physical Therapy     Referral Priority:   Routine     Referral Type:   Physical Medicine     Requested Specialty:   Physical Therapy     Number of Visits Requested:   1     Orders Placed This Encounter   Medications    dilTIAZem (DILT-XR) 120 MG extended release capsule     Sig: Take 1 capsule by mouth three times daily     Dispense:  90 capsule     Refill:  3    naproxen (NAPROSYN) 500 MG tablet     Sig: Take 1 tablet by mouth 2 times daily as needed for Pain     Dispense:  60 tablet     Refill:  0    back is better  Prn nsaids  Back exercise  Wt loss advised     Patient given educational materials - see patient instructions. Discussed use, benefit, and side effects of prescribed medications. All patientquestions answered. Pt voiced understanding. Reviewed health maintenance. Instructedto continue current medications, diet and exercise. Patient agreed with treatmentplan. Follow up as directed.      Electronically signed by Ruben Ojeda MD on 6/16/2021 at 1:41 PM

## 2021-08-16 LAB
ABSOLUTE BASO #: 0.1 X10E9/L (ref 0–0.2)
ABSOLUTE EOS #: 0.2 X10E9/L (ref 0–0.4)
ABSOLUTE LYMPH #: 2.1 X10E9/L (ref 1–3.5)
ABSOLUTE MONO #: 0.7 X10E9/L (ref 0–0.9)
ABSOLUTE NEUT #: 3.9 X10E9/L (ref 1.5–6.6)
ALBUMIN SERPL-MCNC: 4.9 G/DL (ref 3.2–5.3)
ALK PHOSPHATASE: 128 U/L (ref 39–130)
ALT SERPL-CCNC: 25 U/L (ref 0–40)
ANION GAP SERPL CALCULATED.3IONS-SCNC: 16 MMOL/L (ref 5–15)
AST SERPL-CCNC: 25 U/L (ref 0–41)
AVERAGE GLUCOSE: 108 MG/DL
BASOPHILS RELATIVE PERCENT: 0.8 %
BILIRUB SERPL-MCNC: 0.6 MG/DL (ref 0.3–1.2)
BUN BLDV-MCNC: 10 MG/DL (ref 5–27)
CALCIUM SERPL-MCNC: 9.6 MG/DL (ref 8.5–10.5)
CHLORIDE BLD-SCNC: 98 MMOL/L (ref 98–109)
CHOLESTEROL/HDL RATIO: 7 (ref 1–5)
CHOLESTEROL: 306 MG/DL (ref 150–200)
CO2: 23 MMOL/L (ref 22–32)
CREAT SERPL-MCNC: 1.03 MG/DL (ref 0.6–1.3)
EGFR AFRICAN AMERICAN: >60 ML/MIN/1.73SQ.M
EGFR IF NONAFRICAN AMERICAN: >60 ML/MIN/1.73SQ.M
EOSINOPHILS RELATIVE PERCENT: 2.2 %
GLUCOSE: 77 MG/DL (ref 65–99)
HBA1C MFR BLD: 5.4 % (ref 4.4–6.4)
HCT VFR BLD CALC: 42.7 % (ref 39–49)
HDLC SERPL-MCNC: 44 MG/DL
HEMOGLOBIN: 14.9 G/DL (ref 13–17)
LDL CHOLESTEROL CALCULATED: 230 MG/DL
LDL/HDL RATIO: 5.2
LYMPHOCYTE %: 30.9 %
MCH RBC QN AUTO: 32.8 PG (ref 27–34)
MCHC RBC AUTO-ENTMCNC: 35 G/DL (ref 32–36)
MCV RBC AUTO: 94 FL (ref 80–100)
MONOCYTES # BLD: 9.5 %
NEUTROPHILS RELATIVE PERCENT: 56.6 %
PDW BLD-RTO: 13.3 % (ref 11.5–15)
PLATELETS: 211 X10E9/L (ref 150–450)
PMV BLD AUTO: 9 FL (ref 7–12)
POTASSIUM SERPL-SCNC: 4 MMOL/L (ref 3.5–5)
RBC: 4.56 X10E12/L (ref 4.1–5.7)
SODIUM BLD-SCNC: 137 MMOL/L (ref 134–146)
TOTAL PROTEIN: 7.6 G/DL (ref 6–8)
TRIGL SERPL-MCNC: 160 MG/DL (ref 27–150)
VLDLC SERPL CALC-MCNC: 32 MG/DL (ref 0–30)
WBC: 6.8 X10E9/L (ref 4–11)

## 2021-09-03 DIAGNOSIS — Z86.79 HISTORY OF UNSTABLE ANGINA: ICD-10-CM

## 2021-09-03 DIAGNOSIS — E78.2 MIXED HYPERLIPIDEMIA: ICD-10-CM

## 2021-09-22 ENCOUNTER — TELEPHONE (OUTPATIENT)
Dept: INTERNAL MEDICINE CLINIC | Age: 61
End: 2021-09-22

## 2021-09-22 DIAGNOSIS — I25.10 CORONARY ARTERY DISEASE INVOLVING NATIVE CORONARY ARTERY OF NATIVE HEART WITHOUT ANGINA PECTORIS: Primary | ICD-10-CM

## 2021-09-22 NOTE — TELEPHONE ENCOUNTER
Ref to dr Scotty drew  I dont order that test  As need to slow down heart rate to do ct coronary artery screen  Ref to [facility]

## 2021-09-22 NOTE — TELEPHONE ENCOUNTER
Patient is requesting to have a Cardiac Artery Calcification Test done. He states he had an incident in 2018 prior to becoming a patient of yours and it was suggested he get this test at that time but he did not. He is going to be losing his insurance and would like this test done. Please advise.

## 2021-10-13 RX ORDER — DILTIAZEM HYDROCHLORIDE 120 MG/1
120 CAPSULE, EXTENDED RELEASE ORAL 3 TIMES DAILY
Qty: 270 CAPSULE | Refills: 1 | Status: SHIPPED | OUTPATIENT
Start: 2021-10-13 | End: 2021-10-15

## 2021-10-15 ENCOUNTER — NURSE TRIAGE (OUTPATIENT)
Dept: OTHER | Facility: CLINIC | Age: 61
End: 2021-10-15

## 2021-10-15 ENCOUNTER — OFFICE VISIT (OUTPATIENT)
Dept: INTERNAL MEDICINE CLINIC | Age: 61
End: 2021-10-15
Payer: COMMERCIAL

## 2021-10-15 VITALS
BODY MASS INDEX: 30.22 KG/M2 | SYSTOLIC BLOOD PRESSURE: 128 MMHG | HEART RATE: 97 BPM | DIASTOLIC BLOOD PRESSURE: 80 MMHG | HEIGHT: 73 IN | OXYGEN SATURATION: 98 % | WEIGHT: 228 LBS

## 2021-10-15 DIAGNOSIS — I25.10 CORONARY ARTERY DISEASE INVOLVING NATIVE CORONARY ARTERY OF NATIVE HEART WITHOUT ANGINA PECTORIS: ICD-10-CM

## 2021-10-15 DIAGNOSIS — R55 SYNCOPE, UNSPECIFIED SYNCOPE TYPE: Primary | ICD-10-CM

## 2021-10-15 DIAGNOSIS — I95.1 ORTHOSTATIC HYPOTENSION: ICD-10-CM

## 2021-10-15 PROCEDURE — 99214 OFFICE O/P EST MOD 30 MIN: CPT | Performed by: NURSE PRACTITIONER

## 2021-10-15 RX ORDER — DILTIAZEM HYDROCHLORIDE 120 MG/1
120 CAPSULE, EXTENDED RELEASE ORAL DAILY
Qty: 90 CAPSULE | Refills: 1 | Status: SHIPPED | OUTPATIENT
Start: 2021-10-15 | End: 2021-10-22

## 2021-10-15 SDOH — ECONOMIC STABILITY: FOOD INSECURITY: WITHIN THE PAST 12 MONTHS, YOU WORRIED THAT YOUR FOOD WOULD RUN OUT BEFORE YOU GOT MONEY TO BUY MORE.: NEVER TRUE

## 2021-10-15 SDOH — ECONOMIC STABILITY: FOOD INSECURITY: WITHIN THE PAST 12 MONTHS, THE FOOD YOU BOUGHT JUST DIDN'T LAST AND YOU DIDN'T HAVE MONEY TO GET MORE.: NEVER TRUE

## 2021-10-15 ASSESSMENT — ENCOUNTER SYMPTOMS
SHORTNESS OF BREATH: 0
RHINORRHEA: 0
SORE THROAT: 0
COUGH: 0
DIARRHEA: 0
CHEST TIGHTNESS: 0
ABDOMINAL PAIN: 0
TROUBLE SWALLOWING: 0
BLOOD IN STOOL: 0
CONSTIPATION: 0
WHEEZING: 0
VOMITING: 0
NAUSEA: 0
SINUS PAIN: 0

## 2021-10-15 ASSESSMENT — SOCIAL DETERMINANTS OF HEALTH (SDOH): HOW HARD IS IT FOR YOU TO PAY FOR THE VERY BASICS LIKE FOOD, HOUSING, MEDICAL CARE, AND HEATING?: NOT HARD AT ALL

## 2021-10-15 NOTE — PATIENT INSTRUCTIONS
Patient Education        Fainting: Care Instructions  Your Care Instructions     When you faint, or pass out, you lose consciousness for a short time. A brief drop in blood flow to the brain often causes it. When you fall or lie down, more blood flows to your brain and you regain consciousness. Emotional stress, pain, or overheating--especially if you have been standing--can make you faint. In these cases, fainting is usually not serious. But fainting can be a sign of a more serious problem. Your doctor may want you to have more tests to rule out other causes. The treatment you need depends on the reason why you fainted. The doctor has checked you carefully, but problems can develop later. If you notice any problems or new symptoms, get medical treatment right away. Follow-up care is a key part of your treatment and safety. Be sure to make and go to all appointments, and call your doctor if you are having problems. It's also a good idea to know your test results and keep a list of the medicines you take. How can you care for yourself at home? · Drink plenty of fluids to prevent dehydration. If you have kidney, heart, or liver disease and have to limit fluids, talk with your doctor before you increase your fluid intake. When should you call for help? Call 911 anytime you think you may need emergency care. For example, call if:    · You have symptoms of a heart problem. These may include:  ? Chest pain or pressure. ? Severe trouble breathing. ? A fast or irregular heartbeat. ? Lightheadedness or sudden weakness. ? Coughing up pink, foamy mucus. ? Passing out. After you call 911, the  may tell you to chew 1 adult-strength or 2 to 4 low-dose aspirin. Wait for an ambulance. Do not try to drive yourself.     · You have symptoms of a stroke. These may include:  ? Sudden numbness, tingling, weakness, or loss of movement in your face, arm, or leg, especially on only one side of your body.   ? Sudden vision changes. ? Sudden trouble speaking. ? Sudden confusion or trouble understanding simple statements. ? Sudden problems with walking or balance. ? A sudden, severe headache that is different from past headaches.     · You passed out (lost consciousness) again. Watch closely for changes in your health, and be sure to contact your doctor if:    · You do not get better as expected. Where can you learn more? Go to [email or web address]://[email or web address].[email or web address] org and sign in to your [employer] account. Enter L148 in the [employer] box to learn more about \"Fainting: Care Instructions. \"     If you do not have an account, please click on the \"Sign Up Now\" link. Current as of: July 1, 2021               Content Version: 13.0  © 2006-2021 [employer]. Care instructions adapted under license by [facility] ([facility]). If you have questions about a medical condition or this instruction, always ask your healthcare professional. [place] [address] any warranty or liability for your use of this information. Patient Education        Orthostatic Hypotension: Care Instructions  Your Care Instructions     Orthostatic hypotension is a quick drop in blood pressure. It happens when you get up from sitting or lying down. You may feel faint, lightheaded, or dizzy. When a person sits up or stands up, the body changes the way it pumps blood. This can slow the flow of blood to the brain for a very short time. And that can make you feel lightheaded. Many medicines can cause this problem, especially in older people. Lack of fluids (dehydration) or illnesses such as diabetes or heart disease also can cause it. Follow-up care is a key part of your treatment and safety. Be sure to make and go to all appointments, and call your doctor if you are having problems. It's also a good idea to know your test results and keep a list of the medicines you take. How can you care for yourself at home?   · Be safe with medicines. Call your doctor if you think you are having a problem with your medicine. You will get more details on the specific medicines your doctor prescribes. · If you feel dizzy or lightheaded, sit down or lie down for a few minutes. Or you can sit down and put your head between your knees. This will help your blood pressure go back to normal and help your symptoms go away. · Follow your doctor's suggestions for ways to prevent symptoms like dizziness. These suggestions may include:  ? Get up slowly from bed or after sitting for a long time. If you are in bed, roll to your side and swing your legs over the edge of the bed and onto the floor. Push your body up to a sitting position. Wait for a while before you slowly stand up.  ? Add more salt to your diet, if your doctor recommends it. ? Drink plenty of fluids. Choose water and other clear liquids. If you have kidney, heart, or liver disease and have to limit fluids, talk with your doctor before you increase the amount of fluids you drink. ? Avoid or limit alcohol to 2 drinks a day for men and 1 drink a day for women. Alcohol may interfere with your medicine. In addition, alcohol can make your low blood pressure worse by causing your body to lose water. ? Wear compression stockings to help improve blood flow. When should you call for help? Call 911 anytime you think you may need emergency care. For example, call if:    · You passed out (lost consciousness). Watch closely for changes in your health, and be sure to contact your doctor if:    · You do not get better as expected. Where can you learn more? Go to https://[employer] [email or web address] org and sign in to your [employer] account. Enter U119 in the [employer] box to learn more about \"Orthostatic Hypotension: Care Instructions. \"     If you do not have an account, please click on the \"Sign Up Now\" link.   Current as of: April 29, 2021               Content Version: 13.0  © 2880-4492 Healthwise, Inc[employer]. Care instructions adapted under license by [facility] ([facility]). If you have questions about a medical condition or this instruction, always ask your healthcare professional. [address] any warranty or liability for your use of this information.

## 2021-10-15 NOTE — TELEPHONE ENCOUNTER
Reason for Disposition   Age > 50 years    Answer Assessment - Initial Assessment Questions  1. ONSET: \"How long were you unconscious? \" (minutes) \"When did it happen? \"      Not very long,I was walking around the house outside, fell on concrete. 2. CONTENT: \"What happened during period of unconsciousness? \" (e.g., seizure activity)       unknown    3. MENTAL STATUS: \"Alert and oriented now? \" (oriented x 3 = name, month, location)       Alert and oriented now    4. TRIGGER: \"What do you think caused the fainting? \" \"What were you doing just before you fainted? \"  (e.g., exercise, sudden standing up, prolonged standing)      Possibly too much B/P med since I've lost 40 pounds since Memorial Day    5. RECURRENT SYMPTOM: \"Have you ever passed out before? \" If so, ask: \"When was the last time? \" and \"What happened that time? \"       I have years ago, when trying a new sleeping pill. 6. INJURY: \"Did you sustain any injury during the fall? \"       Left wrist pain, knots on left back of  head, and left elbow     7. CARDIAC SYMPTOMS: \"Have you had any of the following symptoms: chest pain, difficulty breathing, palpitations? \"      Denies chest pain, SOB and palpitations    8. NEUROLOGIC SYMPTOMS: \"Have you had any of the following symptoms: headache, numbness, vertigo, weakness? \"      Strange headache night before last after eating a piece of cheese cake and have been avoiding sugars a lot    9. GI SYMPTOMS: \"Have you had any of the following symptoms: abdominal pain, vomiting, diarrhea, blood in stools? \"      I get a knot in my stomach. 10. OTHER SYMPTOMS: \"Do you have any other symptoms? \"        No    11. PREGNANCY: \"Is there any chance you are pregnant? \" \"When was your last menstrual period? \"        No    Protocols used: FAINTING-ADULT-OH    Received call from Althea Parker at . [facility] with [employer].     Brief description of triage: fainting episode    Triage indicates for patient to ED/UCC/PCP office with approval    0577- called [facility] answered and asked us if we could hold, told her 'not for long. \"  Held until 0932. Delay in calling for 2nd level triage with NP due to computer restarting    3968  2nd level triage completed with Dollie Gaucher, NP; provider recommends patient be seen PCP/overflow provider or walk in clinic      Care advice provided, patient verbalizes understanding; denies any other questions or concerns; instructed to call back for any new or worsening symptoms. Writer provided warm transfer to [facility] for appointment scheduling. Attention Provider: Thank you for allowing me to participate in the care of your patient. The patient was connected to triage in response to information provided to the ECC/PSC. Please do not respond through this encounter as the response is not directed to a shared pool.

## 2021-10-15 NOTE — PROGRESS NOTES
Visit Information    Have you changed or started any medications since your last visit including any over-the-counter medicines, vitamins, or herbal medicines? no   Are you having any side effects from any of your medications? -  no  Have you stopped taking any of your medications? Is so, why? -  no    Have you seen any other physician or provider since your last visit? No  Have you had any other diagnostic tests since your last visit? No  Have you been seen in the emergency room and/or had an admission to a hospital since we last saw you? No  Have you had your routine dental cleaning in the past 6 months? yes -     Have you activated your [employer] account? If not, what are your barriers?  Yes     Patient Care Team:  Paty Espinoza MD as PCP - General (Internal Medicine)  Paty Espinoza MD as PCP - [facility]    Medical History Review  Past Medical, Family, and Social History reviewed and does contribute to the patient presenting condition    Health Maintenance   Topic Date Due    Hepatitis C screen  Never done    HIV screen  Never done    DTaP/Tdap/Td vaccine (1 - Tdap) Never done    Colon cancer screen colonoscopy  Never done    Shingles Vaccine (1 of 2) Never done    Flu vaccine (1) Never done    Lipid screen  08/16/2022    COVID-19 Vaccine  Completed    Hepatitis A vaccine  Aged Out    Hepatitis B vaccine  Aged Out    Hib vaccine  Aged Out    Meningococcal (ACWY) vaccine  Aged Out    Pneumococcal 0-64 years Vaccine  Aged Out         [address] [address]  Dept: [phone]  Dept Fax: [phone]    OfficeProgress/Follow Up Note  Date of patient's visit: 10/25/2021  Patient's Name:  Shantanu Stein YOB: 1960            Patient Care Team:  Paty Espinoza MD as PCP - General (Internal Medicine)  Paty Espinoza MD as PCP - [facility]    REASON FOR VISIT:Same day visit    HISTORY OF PRESENT ILLNESS:      History was obtained from the patient. Alessandra Benites is a 64 y.o. male here today for Alessandra Benites is a 64 y.o. male here today for Weight Loss (40lbs since may) and Dizziness (yesterday fainted )    He presents to our office for evaluation of syncopal episode. He reports getting dizzy while walking, lost consciousness and fell on the ground. Patient reports he is compliant with BP medications and has been referred to cardiologist by PCP. Patient Active Problem List   Diagnosis    Class 1 obesity due to excess calories without serious comorbidity with body mass index (BMI) of 33.0 to 33.9 in adult    Acute pain of right shoulder    History of unstable angina    Ex-smoker    Myofascial pain    Mixed hyperlipidemia    Chronic back pain       MEDICATIONS:      Current Outpatient Medications   Medication Sig Dispense Refill    naproxen (NAPROSYN) 500 MG tablet Take 1 tablet by mouth 2 times daily as needed for Pain 60 tablet 0    dilTIAZem (DILT-XR) 120 MG extended release capsule Take 2 capsules by mouth daily 90 capsule 1    atorvastatin (LIPITOR) 10 MG tablet Take 1 tablet by mouth once daily (Patient not taking: Reported on 6/16/2021) 30 tablet 0    aspirin (EQ ASPIRIN ADULT LOW DOSE) 81 MG EC tablet Take 1 tablet by mouth daily (Patient not taking: Reported on 10/15/2021) 90 tablet 0     No current facility-administered medications for this visit. ALLERGIES:    No Known Allergies    SOCIAL HISTORY   Reviewed and no change from previous record. Caryle Ferraris  reports that he has never smoked. He has never used smokeless tobacco.    FAMILY HISTORY:    Reviewed and No change from previous visit    REVIEW OF SYSTEMS:    Review of Systems   Constitutional: Negative for appetite change, chills, diaphoresis, fatigue, fever and unexpected weight change. HENT: Negative for ear pain, postnasal drip, rhinorrhea, sinus pain, sore throat and trouble swallowing.     Eyes: Negative for visual disturbance. Respiratory: Negative for cough, chest tightness, shortness of breath and wheezing. Cardiovascular: Negative for chest pain, palpitations and leg swelling. Gastrointestinal: Negative for abdominal pain, blood in stool, constipation, diarrhea, nausea and vomiting. Endocrine: Negative for polydipsia, polyphagia and polyuria. Genitourinary: Negative for difficulty urinating, dysuria and flank pain. Musculoskeletal: Negative for arthralgias and myalgias. Skin: Negative for rash and wound. Neurological: Negative for dizziness, syncope and weakness. All other systems reviewed and are negative. PHYSICAL EXAM:      Vitals:    10/15/21 1339 10/15/21 1522 10/15/21 1523 10/15/21 1524   BP: (!) 154/82 (!) 162/86 130/80 128/80   Site: Right Upper Arm      Position:  Supine Sitting Standing   Pulse:       SpO2:       Weight:       Height:           Physical Exam  Vitals reviewed. Constitutional:       Appearance: Normal appearance. HENT:      Head: Normocephalic. Cardiovascular:      Rate and Rhythm: Normal rate and regular rhythm. Pulses: Normal pulses. Heart sounds: Normal heart sounds. Pulmonary:      Effort: Pulmonary effort is normal.      Breath sounds: Normal breath sounds. Musculoskeletal:         General: No swelling, tenderness or deformity. Normal range of motion. Skin:     General: Skin is warm and dry. Neurological:      General: No focal deficit present. Mental Status: He is alert and oriented to person, place, and time. Psychiatric:         Mood and Affect: Mood normal.         Behavior: Behavior normal.         Thought Content: Thought content normal.         Judgment: Judgment normal.          ASSESSMENT AND PLAN:      1. Syncope, unspecified syncope type  - reduce Cardizem to one tablet once daily, repeat BP check in 1 week  - follow with cardiology for further work up of syncope    2.  Orthostatic hypotension  - increase fluids and trial compression stockings  - Compression Stocking    3. Coronary artery disease involving native coronary artery of native heart without angina pectoris  - continue ASA      FOLLOW UP AND INSTRUCTIONS:   Return in about 1 week (around 10/22/2021) for BP recheck with MA. Discussed use, benefit, and side effects of prescribed medications. All patient questions answered. Patient voiced understanding. Patient given educational materials - see patient instructions    GAGAN Ritter - CNP   [facility]RAYCr[facility]  10/25/2021, 10:25 AM    Please note that this chart wasgenerated using voice recognition Dragon dictation software. Although every effort was made to ensure the accuracy of this automated transcription, some errors in transcription may have occurred.

## 2021-10-22 ENCOUNTER — NURSE ONLY (OUTPATIENT)
Dept: INTERNAL MEDICINE CLINIC | Age: 61
End: 2021-10-22

## 2021-10-22 VITALS — DIASTOLIC BLOOD PRESSURE: 90 MMHG | SYSTOLIC BLOOD PRESSURE: 140 MMHG

## 2021-10-22 DIAGNOSIS — I10 PRIMARY HYPERTENSION: ICD-10-CM

## 2021-10-22 RX ORDER — DILTIAZEM HYDROCHLORIDE 120 MG/1
240 CAPSULE, EXTENDED RELEASE ORAL DAILY
Qty: 90 CAPSULE | Refills: 1
Start: 2021-10-22 | End: 2021-12-06 | Stop reason: SDUPTHER

## 2021-11-02 RX ORDER — NITROGLYCERIN 0.4 MG/1
0.4 TABLET SUBLINGUAL ONCE
Status: CANCELLED | OUTPATIENT
Start: 2021-11-02

## 2021-11-02 RX ORDER — SODIUM CHLORIDE 0.9 % (FLUSH) 0.9 %
5-40 SYRINGE (ML) INJECTION PRN
Status: CANCELLED | OUTPATIENT
Start: 2021-11-02

## 2021-11-02 RX ORDER — SODIUM CHLORIDE 9 MG/ML
INJECTION, SOLUTION INTRAVENOUS CONTINUOUS
Status: CANCELLED | OUTPATIENT
Start: 2021-11-02

## 2021-11-05 ENCOUNTER — HOSPITAL ENCOUNTER (OUTPATIENT)
Dept: CT IMAGING | Age: 61
Discharge: HOME | End: 2021-11-07
Payer: COMMERCIAL

## 2021-11-05 VITALS
DIASTOLIC BLOOD PRESSURE: 76 MMHG | SYSTOLIC BLOOD PRESSURE: 135 MMHG | OXYGEN SATURATION: 100 % | HEART RATE: 52 BPM | RESPIRATION RATE: 16 BRPM

## 2021-11-05 DIAGNOSIS — R55 SYNCOPE, UNSPECIFIED SYNCOPE TYPE: ICD-10-CM

## 2021-11-05 DIAGNOSIS — R06.09 OTHER FORM OF DYSPNEA: ICD-10-CM

## 2021-11-05 DIAGNOSIS — R07.89 OTHER CHEST PAIN: ICD-10-CM

## 2021-11-05 LAB
BUN BLDV-MCNC: 14 MG/DL (ref 8–23)
CREAT SERPL-MCNC: 1.03 MG/DL (ref 0.7–1.2)
GFR AFRICAN AMERICAN: >60 ML/MIN
GFR NON-AFRICAN AMERICAN: >60 ML/MIN
GFR SERPL CREATININE-BSD FRML MDRD: NORMAL ML/MIN/{1.73_M2}
GFR SERPL CREATININE-BSD FRML MDRD: NORMAL ML/MIN/{1.73_M2}

## 2021-11-05 PROCEDURE — 84520 ASSAY OF UREA NITROGEN: CPT

## 2021-11-05 PROCEDURE — 36415 COLL VENOUS BLD VENIPUNCTURE: CPT

## 2021-11-05 PROCEDURE — 6360000004 HC RX CONTRAST MEDICATION: Performed by: INTERNAL MEDICINE

## 2021-11-05 PROCEDURE — 6370000000 HC RX 637 (ALT 250 FOR IP): Performed by: INTERNAL MEDICINE

## 2021-11-05 PROCEDURE — 2580000003 HC RX 258: Performed by: INTERNAL MEDICINE

## 2021-11-05 PROCEDURE — 82565 ASSAY OF CREATININE: CPT

## 2021-11-05 PROCEDURE — 75574 CT ANGIO HRT W/3D IMAGE: CPT

## 2021-11-05 RX ORDER — 0.9 % SODIUM CHLORIDE 0.9 %
90 INTRAVENOUS SOLUTION INTRAVENOUS ONCE
Status: COMPLETED | OUTPATIENT
Start: 2021-11-05 | End: 2021-11-05

## 2021-11-05 RX ORDER — SODIUM CHLORIDE 9 MG/ML
INJECTION, SOLUTION INTRAVENOUS CONTINUOUS
Status: DISCONTINUED | OUTPATIENT
Start: 2021-11-05 | End: 2021-11-08 | Stop reason: HOSPADM

## 2021-11-05 RX ORDER — METOPROLOL TARTRATE 50 MG/1
100 TABLET, FILM COATED ORAL ONCE
Status: COMPLETED | OUTPATIENT
Start: 2021-11-05 | End: 2021-11-05

## 2021-11-05 RX ORDER — SODIUM CHLORIDE 0.9 % (FLUSH) 0.9 %
5-40 SYRINGE (ML) INJECTION PRN
Status: DISCONTINUED | OUTPATIENT
Start: 2021-11-05 | End: 2021-11-08 | Stop reason: HOSPADM

## 2021-11-05 RX ORDER — SODIUM CHLORIDE 0.9 % (FLUSH) 0.9 %
10 SYRINGE (ML) INJECTION PRN
Status: DISCONTINUED | OUTPATIENT
Start: 2021-11-05 | End: 2021-11-08 | Stop reason: HOSPADM

## 2021-11-05 RX ORDER — NITROGLYCERIN 0.4 MG/1
0.4 TABLET SUBLINGUAL ONCE
Status: COMPLETED | OUTPATIENT
Start: 2021-11-05 | End: 2021-11-05

## 2021-11-06 ENCOUNTER — HOSPITAL ENCOUNTER (INPATIENT)
Age: 61
LOS: 3 days | Discharge: HOME | DRG: 247 | End: 2021-11-09
Attending: EMERGENCY MEDICINE | Admitting: INTERNAL MEDICINE
Payer: COMMERCIAL

## 2021-11-06 DIAGNOSIS — I25.10 CORONARY ARTERY DISEASE INVOLVING NATIVE CORONARY ARTERY OF NATIVE HEART WITHOUT ANGINA PECTORIS: Primary | ICD-10-CM

## 2021-11-06 LAB
ABSOLUTE EOS #: 0.13 K/UL (ref 0–0.44)
ABSOLUTE IMMATURE GRANULOCYTE: <0.03 K/UL (ref 0–0.3)
ABSOLUTE LYMPH #: 1.94 K/UL (ref 1.1–3.7)
ABSOLUTE MONO #: 0.77 K/UL (ref 0.1–1.2)
ANION GAP SERPL CALCULATED.3IONS-SCNC: 14 MMOL/L (ref 9–17)
BASOPHILS # BLD: 2 % (ref 0–2)
BASOPHILS ABSOLUTE: 0.09 K/UL (ref 0–0.2)
BNP INTERPRETATION: NORMAL
BUN BLDV-MCNC: 16 MG/DL (ref 8–23)
BUN/CREAT BLD: NORMAL (ref 9–20)
CALCIUM SERPL-MCNC: 9.4 MG/DL (ref 8.6–10.4)
CHLORIDE BLD-SCNC: 103 MMOL/L (ref 98–107)
CO2: 21 MMOL/L (ref 20–31)
CREAT SERPL-MCNC: 0.88 MG/DL (ref 0.7–1.2)
DIFFERENTIAL TYPE: ABNORMAL
EOSINOPHILS RELATIVE PERCENT: 2 % (ref 1–4)
GFR AFRICAN AMERICAN: >60 ML/MIN
GFR NON-AFRICAN AMERICAN: >60 ML/MIN
GFR SERPL CREATININE-BSD FRML MDRD: NORMAL ML/MIN/{1.73_M2}
GFR SERPL CREATININE-BSD FRML MDRD: NORMAL ML/MIN/{1.73_M2}
GLUCOSE BLD-MCNC: 93 MG/DL (ref 70–99)
HCT VFR BLD CALC: 45.8 % (ref 40.7–50.3)
HEMOGLOBIN: 15.6 G/DL (ref 13–17)
IMMATURE GRANULOCYTES: 0 %
LYMPHOCYTES # BLD: 33 % (ref 24–43)
MCH RBC QN AUTO: 33.4 PG (ref 25.2–33.5)
MCHC RBC AUTO-ENTMCNC: 34.1 G/DL (ref 28.4–34.8)
MCV RBC AUTO: 98.1 FL (ref 82.6–102.9)
MONOCYTES # BLD: 13 % (ref 3–12)
NRBC AUTOMATED: 0 PER 100 WBC
PARTIAL THROMBOPLASTIN TIME: 24.6 SEC (ref 20.5–30.5)
PARTIAL THROMBOPLASTIN TIME: 34.4 SEC (ref 20.5–30.5)
PDW BLD-RTO: 13.1 % (ref 11.8–14.4)
PLATELET # BLD: 243 K/UL (ref 138–453)
PLATELET ESTIMATE: ABNORMAL
PMV BLD AUTO: 10 FL (ref 8.1–13.5)
POTASSIUM SERPL-SCNC: 4.2 MMOL/L (ref 3.7–5.3)
PRO-BNP: 92 PG/ML
RBC # BLD: 4.67 M/UL (ref 4.21–5.77)
RBC # BLD: ABNORMAL 10*6/UL
SARS-COV-2, RAPID: NOT DETECTED
SEG NEUTROPHILS: 50 % (ref 36–65)
SEGMENTED NEUTROPHILS ABSOLUTE COUNT: 2.92 K/UL (ref 1.5–8.1)
SODIUM BLD-SCNC: 138 MMOL/L (ref 135–144)
SPECIMEN DESCRIPTION: NORMAL
TROPONIN INTERP: NORMAL
TROPONIN T: NORMAL NG/ML
TROPONIN, HIGH SENSITIVITY: 10 NG/L (ref 0–22)
WBC # BLD: 5.9 K/UL (ref 3.5–11.3)
WBC # BLD: ABNORMAL 10*3/UL

## 2021-11-06 PROCEDURE — 99223 1ST HOSP IP/OBS HIGH 75: CPT | Performed by: INTERNAL MEDICINE

## 2021-11-06 PROCEDURE — 2580000003 HC RX 258: Performed by: STUDENT IN AN ORGANIZED HEALTH CARE EDUCATION/TRAINING PROGRAM

## 2021-11-06 PROCEDURE — 85730 THROMBOPLASTIN TIME PARTIAL: CPT

## 2021-11-06 PROCEDURE — 84484 ASSAY OF TROPONIN QUANT: CPT

## 2021-11-06 PROCEDURE — 80048 BASIC METABOLIC PNL TOTAL CA: CPT

## 2021-11-06 PROCEDURE — 85025 COMPLETE CBC W/AUTO DIFF WBC: CPT

## 2021-11-06 PROCEDURE — 6360000002 HC RX W HCPCS: Performed by: STUDENT IN AN ORGANIZED HEALTH CARE EDUCATION/TRAINING PROGRAM

## 2021-11-06 PROCEDURE — 2060000000 HC ICU INTERMEDIATE R&B

## 2021-11-06 PROCEDURE — 93005 ELECTROCARDIOGRAM TRACING: CPT | Performed by: STUDENT IN AN ORGANIZED HEALTH CARE EDUCATION/TRAINING PROGRAM

## 2021-11-06 PROCEDURE — 36415 COLL VENOUS BLD VENIPUNCTURE: CPT

## 2021-11-06 PROCEDURE — 83880 ASSAY OF NATRIURETIC PEPTIDE: CPT

## 2021-11-06 PROCEDURE — 94761 N-INVAS EAR/PLS OXIMETRY MLT: CPT

## 2021-11-06 PROCEDURE — 87635 SARS-COV-2 COVID-19 AMP PRB: CPT

## 2021-11-06 PROCEDURE — 99285 EMERGENCY DEPT VISIT HI MDM: CPT

## 2021-11-06 PROCEDURE — 6370000000 HC RX 637 (ALT 250 FOR IP): Performed by: STUDENT IN AN ORGANIZED HEALTH CARE EDUCATION/TRAINING PROGRAM

## 2021-11-06 RX ORDER — ASPIRIN 81 MG/1
81 TABLET ORAL DAILY
Status: DISCONTINUED | OUTPATIENT
Start: 2021-11-06 | End: 2021-11-09 | Stop reason: HOSPADM

## 2021-11-06 RX ORDER — POTASSIUM CHLORIDE 20 MEQ/1
40 TABLET, EXTENDED RELEASE ORAL PRN
Status: DISCONTINUED | OUTPATIENT
Start: 2021-11-06 | End: 2021-11-09 | Stop reason: HOSPADM

## 2021-11-06 RX ORDER — SODIUM CHLORIDE 9 MG/ML
INJECTION, SOLUTION INTRAVENOUS CONTINUOUS
Status: DISCONTINUED | OUTPATIENT
Start: 2021-11-06 | End: 2021-11-07

## 2021-11-06 RX ORDER — ACETAMINOPHEN 650 MG/1
650 SUPPOSITORY RECTAL EVERY 6 HOURS PRN
Status: DISCONTINUED | OUTPATIENT
Start: 2021-11-06 | End: 2021-11-09 | Stop reason: HOSPADM

## 2021-11-06 RX ORDER — SODIUM CHLORIDE 0.9 % (FLUSH) 0.9 %
5-40 SYRINGE (ML) INJECTION PRN
Status: DISCONTINUED | OUTPATIENT
Start: 2021-11-06 | End: 2021-11-09 | Stop reason: HOSPADM

## 2021-11-06 RX ORDER — ATORVASTATIN CALCIUM 80 MG/1
40 TABLET, FILM COATED ORAL NIGHTLY
Status: DISCONTINUED | OUTPATIENT
Start: 2021-11-06 | End: 2021-11-09 | Stop reason: HOSPADM

## 2021-11-06 RX ORDER — HEPARIN SODIUM 1000 [USP'U]/ML
2000 INJECTION, SOLUTION INTRAVENOUS; SUBCUTANEOUS PRN
Status: DISCONTINUED | OUTPATIENT
Start: 2021-11-06 | End: 2021-11-08

## 2021-11-06 RX ORDER — HEPARIN SODIUM 1000 [USP'U]/ML
4000 INJECTION, SOLUTION INTRAVENOUS; SUBCUTANEOUS ONCE
Status: COMPLETED | OUTPATIENT
Start: 2021-11-06 | End: 2021-11-06

## 2021-11-06 RX ORDER — HEPARIN SODIUM 10000 [USP'U]/100ML
5-30 INJECTION, SOLUTION INTRAVENOUS CONTINUOUS
Status: DISCONTINUED | OUTPATIENT
Start: 2021-11-06 | End: 2021-11-08

## 2021-11-06 RX ORDER — CARVEDILOL 3.12 MG/1
3.12 TABLET ORAL 2 TIMES DAILY WITH MEALS
Status: DISCONTINUED | OUTPATIENT
Start: 2021-11-06 | End: 2021-11-09 | Stop reason: HOSPADM

## 2021-11-06 RX ORDER — HEPARIN SODIUM 1000 [USP'U]/ML
4000 INJECTION, SOLUTION INTRAVENOUS; SUBCUTANEOUS PRN
Status: DISCONTINUED | OUTPATIENT
Start: 2021-11-06 | End: 2021-11-08

## 2021-11-06 RX ORDER — ONDANSETRON 2 MG/ML
4 INJECTION INTRAMUSCULAR; INTRAVENOUS EVERY 4 HOURS PRN
Status: DISCONTINUED | OUTPATIENT
Start: 2021-11-06 | End: 2021-11-09 | Stop reason: HOSPADM

## 2021-11-06 RX ORDER — POTASSIUM CHLORIDE 7.45 MG/ML
10 INJECTION INTRAVENOUS PRN
Status: DISCONTINUED | OUTPATIENT
Start: 2021-11-06 | End: 2021-11-09 | Stop reason: HOSPADM

## 2021-11-06 RX ORDER — ACETAMINOPHEN 325 MG/1
650 TABLET ORAL EVERY 6 HOURS PRN
Status: DISCONTINUED | OUTPATIENT
Start: 2021-11-06 | End: 2021-11-09 | Stop reason: HOSPADM

## 2021-11-06 RX ORDER — POLYETHYLENE GLYCOL 3350 17 G/17G
17 POWDER, FOR SOLUTION ORAL DAILY PRN
Status: DISCONTINUED | OUTPATIENT
Start: 2021-11-06 | End: 2021-11-09 | Stop reason: HOSPADM

## 2021-11-06 RX ORDER — DILTIAZEM HYDROCHLORIDE 240 MG/1
240 CAPSULE, COATED, EXTENDED RELEASE ORAL DAILY
Status: DISCONTINUED | OUTPATIENT
Start: 2021-11-06 | End: 2021-11-09 | Stop reason: HOSPADM

## 2021-11-06 RX ORDER — SODIUM CHLORIDE 0.9 % (FLUSH) 0.9 %
5-40 SYRINGE (ML) INJECTION EVERY 12 HOURS SCHEDULED
Status: DISCONTINUED | OUTPATIENT
Start: 2021-11-06 | End: 2021-11-09 | Stop reason: HOSPADM

## 2021-11-06 RX ORDER — DILTIAZEM HYDROCHLORIDE 120 MG/1
240 CAPSULE, EXTENDED RELEASE ORAL DAILY
Status: DISCONTINUED | OUTPATIENT
Start: 2021-11-06 | End: 2021-11-06

## 2021-11-06 RX ORDER — SODIUM CHLORIDE 9 MG/ML
25 INJECTION, SOLUTION INTRAVENOUS PRN
Status: DISCONTINUED | OUTPATIENT
Start: 2021-11-06 | End: 2021-11-09 | Stop reason: HOSPADM

## 2021-11-06 RX ADMIN — SODIUM CHLORIDE: 9 INJECTION, SOLUTION INTRAVENOUS at 21:07

## 2021-11-06 RX ADMIN — SODIUM CHLORIDE: 9 INJECTION, SOLUTION INTRAVENOUS at 13:10

## 2021-11-06 RX ADMIN — CARVEDILOL 3.12 MG: 3.12 TABLET, FILM COATED ORAL at 17:25

## 2021-11-06 RX ADMIN — HEPARIN SODIUM 2000 UNITS: 1000 INJECTION, SOLUTION INTRAVENOUS; SUBCUTANEOUS at 21:55

## 2021-11-06 RX ADMIN — HEPARIN SODIUM 4000 UNITS: 1000 INJECTION, SOLUTION INTRAVENOUS; SUBCUTANEOUS at 13:12

## 2021-11-06 RX ADMIN — ASPIRIN 81 MG: 81 TABLET, COATED ORAL at 13:11

## 2021-11-06 RX ADMIN — ATORVASTATIN CALCIUM 40 MG: 80 TABLET, FILM COATED ORAL at 21:07

## 2021-11-06 RX ADMIN — HEPARIN SODIUM AND DEXTROSE 10 UNITS/KG/HR: 10000; 5 INJECTION INTRAVENOUS at 13:13

## 2021-11-06 ASSESSMENT — ENCOUNTER SYMPTOMS
VOMITING: 0
SHORTNESS OF BREATH: 0
CHEST TIGHTNESS: 0
TROUBLE SWALLOWING: 0
CONSTIPATION: 0
ABDOMINAL PAIN: 0
COUGH: 0
DIARRHEA: 0
NAUSEA: 0
COLOR CHANGE: 0
BACK PAIN: 0

## 2021-11-06 NOTE — H&P
air.  Currently denies any chest pain/shortness of breath/palpitation/dizziness. He is on heparin drip. Review of Systems   Constitutional: Positive for fatigue. Negative for activity change, appetite change, chills, diaphoresis and fever. HENT: Negative. Negative for congestion, facial swelling, hearing loss, postnasal drip, rhinorrhea, sinus pain, tinnitus and voice change. Eyes: Negative. Negative for photophobia, pain, discharge, redness, itching and visual disturbance. Respiratory: Positive for cough and chest tightness. Negative for apnea, choking, shortness of breath, wheezing and stridor. Cardiovascular: Positive for chest pain. Negative for palpitations and leg swelling. Gastrointestinal: Positive for nausea. Negative for abdominal distention, abdominal pain, constipation, diarrhea and vomiting. Endocrine: Negative. Negative for cold intolerance, heat intolerance and polyuria. Genitourinary: Negative. Negative for difficulty urinating, dysuria, frequency and urgency. Musculoskeletal: Negative. Negative for arthralgias, back pain, gait problem and myalgias. Skin: Negative. Negative for color change, pallor, rash and wound. Allergic/Immunologic: Negative. Neurological: Positive for dizziness and syncope. Negative for tremors, seizures, facial asymmetry, speech difficulty, weakness, light-headedness, numbness and headaches. Hematological: Negative. Negative for adenopathy. Does not bruise/bleed easily. Psychiatric/Behavioral: Negative. Negative for agitation, behavioral problems, confusion, decreased concentration, dysphoric mood, hallucinations, self-injury, sleep disturbance and suicidal ideas. The patient is not nervous/anxious and is not hyperactive. PAST MEDICAL HISTORY     Hypertension, hyperlipidemia, former tobacco smoker    PAST SURGICAL HISTORY     No past surgical history on file. ALLERGIES     Patient has no known allergies.     MEDICATIONS PRIOR TO ADMISSION     Prior to Admission medications    Medication Sig Start Date End Date Taking? Authorizing Provider   dilTIAZem (DILT-XR) 120 MG extended release capsule Take 2 capsules by mouth daily 10/22/21 1/20/22 Yes GAGAN Mckeon CNP   naproxen (NAPROSYN) 500 MG tablet Take 1 tablet by mouth 2 times daily as needed for Pain 21  Yes Dylan Fernandez MD   atorvastatin (LIPITOR) 10 MG tablet Take 1 tablet by mouth once daily  Patient not taking: Reported on 2021   Dylan Fernandez MD   aspirin (EQ ASPIRIN ADULT LOW DOSE) 81 MG EC tablet Take 1 tablet by mouth daily  Patient not taking: Reported on 10/15/2021 1/21/21   Dylan Fernandez MD       SOCIAL HISTORY     Tobacco: former, quit more than 20 years ago. Smoked for around 1-2 years  Alcohol: YES, 3-4 times/week  Illicits: Denies    FAMILY HISTORY     No family history on file. PHYSICAL EXAM     Vitals: /76   Pulse 59   Temp 97.5 °F (36.4 °C)   Resp 14   Ht 6' 2\" (1.88 m)   Wt 230 lb (104.3 kg)   SpO2 94%   BMI 29.53 kg/m²   Tmax: Temp (24hrs), Av.5 °F (36.4 °C), Min:97.5 °F (36.4 °C), Max:97.5 °F (36.4 °C)    Last Body weight:   Wt Readings from Last 3 Encounters:   21 230 lb (104.3 kg)   10/15/21 228 lb (103.4 kg)   21 253 lb (114.8 kg)     Body Mass Index : Body mass index is 29.53 kg/m². PHYSICAL EXAMINATION:  Constitutional: This is a well developed, well nourished, 25-29.9 - Overweight 64y.o. year old male who is alert, oriented, cooperative and in no apparent distress. Head:normocephalic and atraumatic. EENT:  PERRLA. No conjunctival injections. Septum was midline, mucosa was without erythema, exudates or cobblestoning. No thrush was noted. Neck: Supple without thyromegaly. No elevated JVP. Trachea was midline. Respiratory: Chest was symmetrical without dullness to percussion. Breath sounds bilaterally were clear to auscultation. There were no wheezes, rhonchi or rales.  There is no intercostal retraction or use of accessory muscles. No egophony noted. Cardiovascular: Regular without murmur, clicks, gallops or rubs. Abdomen: Slightly rounded and soft without organomegaly. No rebound, rigidity or guarding was appreciated. Lymphatic: No lymphadenopathy. Musculoskeletal: Normal curvature of the spine. No gross muscle weakness. Extremities:  No lower extremity edema, ulcerations, tenderness, varicosities or erythema. Muscle size, tone and strength are normal.  No involuntary movements are noted. Skin:  Warm and dry. Good color, turgor and pigmentation. No lesions or scars.   No cyanosis or clubbing  Neurological/Psychiatric: The patient's general behavior, level of consciousness, thought content and emotional status is normal.          INVESTIGATIONS     Laboratory Testing:     Recent Results (from the past 24 hour(s))   CBC Auto Differential    Collection Time: 11/06/21 11:19 AM   Result Value Ref Range    WBC 5.9 3.5 - 11.3 k/uL    RBC 4.67 4.21 - 5.77 m/uL    Hemoglobin 15.6 13.0 - 17.0 g/dL    Hematocrit 45.8 40.7 - 50.3 %    MCV 98.1 82.6 - 102.9 fL    MCH 33.4 25.2 - 33.5 pg    MCHC 34.1 28.4 - 34.8 g/dL    RDW 13.1 11.8 - 14.4 %    Platelets 525 334 - 430 k/uL    MPV 10.0 8.1 - 13.5 fL    NRBC Automated 0.0 0.0 per 100 WBC    Differential Type NOT REPORTED     Seg Neutrophils 50 36 - 65 %    Lymphocytes 33 24 - 43 %    Monocytes 13 (H) 3 - 12 %    Eosinophils % 2 1 - 4 %    Basophils 2 0 - 2 %    Immature Granulocytes 0 0 %    Segs Absolute 2.92 1.50 - 8.10 k/uL    Absolute Lymph # 1.94 1.10 - 3.70 k/uL    Absolute Mono # 0.77 0.10 - 1.20 k/uL    Absolute Eos # 0.13 0.00 - 0.44 k/uL    Basophils Absolute 0.09 0.00 - 0.20 k/uL    Absolute Immature Granulocyte <0.03 0.00 - 0.30 k/uL    WBC Morphology NOT REPORTED     RBC Morphology NOT REPORTED     Platelet Estimate NOT REPORTED    Basic Metabolic Panel w/ Reflex to MG    Collection Time: 11/06/21 11:19 AM   Result Value Ref Range    Glucose 93 70 - 99 mg/dL    BUN 16 8 - 23 mg/dL    CREATININE 0.88 0.70 - 1.20 mg/dL    Bun/Cre Ratio NOT REPORTED 9 - 20    Calcium 9.4 8.6 - 10.4 mg/dL    Sodium 138 135 - 144 mmol/L    Potassium 4.2 3.7 - 5.3 mmol/L    Chloride 103 98 - 107 mmol/L    CO2 21 20 - 31 mmol/L    Anion Gap 14 9 - 17 mmol/L    GFR Non-African American >60 >60 mL/min    GFR African American >60 >60 mL/min    GFR Comment          GFR Staging NOT REPORTED    Troponin    Collection Time: 11/06/21 11:19 AM   Result Value Ref Range    Troponin, High Sensitivity 10 0 - 22 ng/L    Troponin T NOT REPORTED <0.03 ng/mL    Troponin Interp NOT REPORTED    Brain Natriuretic Peptide    Collection Time: 11/06/21 11:19 AM   Result Value Ref Range    Pro-BNP 92 <300 pg/mL    BNP Interpretation NOT REPORTED    COVID-19, Rapid    Collection Time: 11/06/21 11:19 AM    Specimen: Nasopharyngeal Swab   Result Value Ref Range    Specimen Description . NASOPHARYNGEAL SWAB     SARS-CoV-2, Rapid Not Detected Not Detected   APTT    Collection Time: 11/06/21 11:19 AM   Result Value Ref Range    PTT 24.6 20.5 - 30.5 sec       Imaging:   CTA CORON EJECT FRAC WALL MOTION    Result Date: 11/5/2021  1. Soft plaquing is seen involving the mid RCA causing 75-90% stenosis. Further evaluation and treatment with PTCA is suggested. 2. Calcification involving the proximal left circumflex artery causing less than 50% stenosis. 3. Left ventricular ejection fraction is normal at 65%. The findings were sent to the Radiology Results [address] at 3:49 pm on 11/5/2021to be communicated to a licensed caregiver. ASSESSMENT & PLAN     ASSESSMENT / PLAN:     IMPRESSION  This is a 64 y.o. male who presented with recurrent chest pain and syncopal episodes and recent RTA with significant obstructive disease. He is admitted for ischemia work-up for CAD with possible cath.     CAD in native artery  CT coronary angiography showed soft plaque involving mid RCA with 75 to 90% stenosis and calcification of left proximal circumflex artery causing less than 50% stenosis. Cardiology on board  Started on IV heparin  Started on Coreg 3.125 mg  On aspirin and high-dose statin  Tentative plan for cath on Monday unless hemodynamic instability    Hypertension  We will resume Cardizem  mg OD    Mixed hyperlipidemia  On atorvastatin 40 mg    Obesity    DVT ppx: On heparin    GI ppx: On Pepcid    PT/OT/SW:   Consulted    Discharge Planning:    to assist in discharge Mitra Peoples MD  Internal Medicine Resident, PGY-*1  [facility]; [place], [place]  11/6/2021, 2:28 PM    I have discussed the care of Tia Dhaliwal , including pertinent history and exam findings,    today with the resident. I have seen and examined the patient and the key elements of all parts of the encounter have been performed by me . I agree with the assessment, plan and orders as documented by the resident. Active Problems:    Ex-smoker    Mixed hyperlipidemia    CAD in native artery    Unstable angina (HCC)  Resolved Problems:    * No resolved hospital problems. *        Overall  course ;                                   are improving over time.         Patient was transfer from his cardiologist with complaints of intermittent chest pain and one episode of syncope  had abnormal CTA chest   cardiology following  plan for cardiac cath          Electronically signed by Anya Hampton MD

## 2021-11-06 NOTE — PROGRESS NOTES
Patient with symptoms of possible unstable angina. Hx of syncopal episode as well. Recent CTA with significant obstructive disease and advised to come to the hospital.     CTA 11/5/21:  1. Soft plaquing is seen involving the mid RCA causing 75-90% stenosis. Further evaluation and treatment with PTCA is suggested. 2. Calcification involving the proximal left circumflex artery causing less   than 50% stenosis. 3. Left ventricular ejection fraction is normal at 65%. Recs:  - start low dose heparin gtt protocol  - continue asa, statin  - telemetry  - tentative plan for cath on Monday unless hemodynamic instability    Discussed with Dr. Tj Trujillo. Adalgisa Maddox MD  Cardiology       From clinic note: Dr. Jain Jose  1. Hypertension  2. Hyperlipidemia  3. Previous history of smoking quit 10 years ago  4. Positive family history for CAD his father had MI at age 43  8. Negative cardiac workup including echocardiogram and nuclear stress test as per patient in 2018  6.  Obesity

## 2021-11-06 NOTE — ED NOTES
The following labs labeled with pt sticker and tubed to lab:     [x] Blue     [x] Lavender   [] on ice  [x] Green/yellow  [x] Green/black [] on ice  [] Yellow  [] Red  [] Pink      [x] COVID-19 swab    [x] Rapid  [] PCR  [] Peds Viral Panel     [] Urine Sample  [] Pelvic Cultures  [] Blood Cultures          Sue Roldan RN  11/06/21 2575

## 2021-11-06 NOTE — ED PROVIDER NOTES
[facility] ED  Emergency Department Encounter  EmergencyMedicine Resident     Pt Name:Lalo Lloyd  MRN: [id number]  [place] 1960  Date of evaluation: 11/6/21  PCP:  Perla Li MD    This patient was evaluated in the Emergency Department for symptoms described in the history of present illness. The patient was evaluated in the context of the global COVID-19 pandemic, which necessitated consideration that the patient might be at risk for infection with the SARS-CoV-2 virus that causes COVID-19. Institutional protocols and algorithms that pertain to the evaluation of patients at risk for COVID-19 are in a state of rapid change based on information released by regulatory bodies including the CDC and federal and state organizations. These policies and algorithms were followed during the patient's care in the ED. CHIEF COMPLAINT       Chief Complaint   Patient presents with    Other     cardio wants admited CT showed 90% blocked states needs cath    Chest Pain     isolated episode of left sided chest pain 2 weeks ago, lasting 20 seconds in duration.  Loss of Consciousness     syncopal episode last week while walking down. HISTORY OF PRESENT ILLNESS  (Location/Symptom, Timing/Onset, Context/Setting, Quality, Duration, Modifying Factors, Severity.)      Jeol Marlow is a 64 y.o. male past medical history HTN and HLD who presents with abnormal findings of CTA of chest.  Patient had CTA of chest yesterday which showed 75 to 90% blockage of RCA. Patient was told by his cardiologist to come in to the emergency department for possible intervention. Is currently not having any symptoms at all. Denies any chest pain, shortness of breath, abdominal pain, nausea vomiting or diarrhea. Patient does state 2 weeks ago he had a syncopal episode where he hit his head and lost consciousness. He denies any diplopia, dysphagia, dysmetria, dysarthria.   Patient states he does not get chest pain or shortness of breath at rest or with exertion. He does state about 4 years ago he had an episode of chest pain that lasted about 20 minutes. Patient does state his father had stents put in. PAST MEDICAL / SURGICAL / SOCIAL / FAMILY HISTORY      has no past medical history on file. has no past surgical history on file. Social History     Socioeconomic History    Marital status: Single     Spouse name: Not on file    Number of children: Not on file    Years of education: Not on file    Highest education level: Not on file   Occupational History    Not on file   Tobacco Use    Smoking status: Never Smoker    Smokeless tobacco: Never Used   Substance and Sexual Activity    Alcohol use: Yes    Drug use: Never    Sexual activity: Not on file   Other Topics Concern    Not on file   Social History Narrative    Not on file     Social Determinants of Health     Financial Resource Strain: Low Risk     Difficulty of Paying Living Expenses: Not hard at all   Food Insecurity: No Food Insecurity    Worried About Running Out of Food in the Last Year: Never true    [address] in the Last Year: Never true   Transportation Needs:     Lack of Transportation (Medical): Not on file    Lack of Transportation (Non-Medical):  Not on file   Physical Activity:     Days of Exercise per Week: Not on file    Minutes of Exercise per Session: Not on file   Stress:     Feeling of Stress : Not on file   Social Connections:     Frequency of Communication with Friends and Family: Not on file    Frequency of Social Gatherings with Friends and Family: Not on file    Attends Confucianist Services: Not on file    Active Member of Clubs or Organizations: Not on file    Attends Club or Organization Meetings: Not on file    Marital Status: Not on file   Intimate Partner Violence:     Fear of Current or Ex-Partner: Not on file    Emotionally Abused: Not on file    Physically Abused: Not on file   Whitney Aditi Sexually Abused: Not on file   Housing Stability:     Unable to Pay for Housing in the Last Year: Not on file    Number of Places Lived in the Last Year: Not on file    Unstable Housing in the Last Year: Not on file       No family history on file. Allergies:  Patient has no known allergies. Home Medications:  Prior to Admission medications    Medication Sig Start Date End Date Taking? Authorizing Provider   dilTIAZem (DILT-XR) 120 MG extended release capsule Take 2 capsules by mouth daily 10/22/21 1/20/22  GAGAN Garcia CNP   naproxen (NAPROSYN) 500 MG tablet Take 1 tablet by mouth 2 times daily as needed for Pain 6/16/21   Donita Weir MD   atorvastatin (LIPITOR) 10 MG tablet Take 1 tablet by mouth once daily  Patient not taking: Reported on 6/16/2021 4/8/21   Donita Weir MD   aspirin (EQ ASPIRIN ADULT LOW DOSE) 81 MG EC tablet Take 1 tablet by mouth daily  Patient not taking: Reported on 10/15/2021 1/21/21   Donita Weir MD       REVIEW OF SYSTEMS    (2-9 systems for level 4, 10 or more for level 5)      Review of Systems   Constitutional: Negative for appetite change, fatigue and fever. HENT: Negative for congestion and trouble swallowing. Respiratory: Negative for cough, chest tightness and shortness of breath. Cardiovascular: Negative for chest pain and leg swelling. Gastrointestinal: Negative for abdominal pain, constipation, diarrhea, nausea and vomiting. Genitourinary: Negative for dysuria. Musculoskeletal: Negative for back pain. Skin: Negative for color change and rash. Neurological: Negative for dizziness, weakness and headaches. PHYSICAL EXAM   (up to 7 for level 4, 8 or more for level 5)      INITIAL VITALS:   /76   Pulse 59   Temp 97.5 °F (36.4 °C)   Resp 18   Ht 6' 2\" (1.88 m)   Wt 230 lb (104.3 kg)   SpO2 94%   BMI 29.53 kg/m²     Physical Exam  Constitutional:       General: He is not in acute distress.   HENT:      Head: Normocephalic and atraumatic. Nose: No congestion. Eyes:      General: No scleral icterus. Pupils: Pupils are equal, round, and reactive to light. Cardiovascular:      Rate and Rhythm: Normal rate. Heart sounds: No murmur heard. No friction rub. No gallop. Pulmonary:      Breath sounds: No wheezing, rhonchi or rales. Abdominal:      General: Abdomen is flat. There is no distension. Palpations: Abdomen is soft. Tenderness: There is no abdominal tenderness. There is no guarding or rebound. Musculoskeletal:         General: No swelling. Cervical back: Normal range of motion. Skin:     Findings: No rash. Neurological:      General: No focal deficit present. DIFFERENTIAL  DIAGNOSIS     PLAN (LABS / IMAGING / EKG):  Orders Placed This Encounter   Procedures    COVID-19, Rapid    CBC Auto Differential    Basic Metabolic Panel w/ Reflex to MG    Troponin    Brain Natriuretic Peptide    APTT    APTT    Inpatient consult to Cardiology    EKG 12 Lead    PATIENT STATUS (FROM ED OR OR/PROCEDURAL) Observation       MEDICATIONS ORDERED:  Orders Placed This Encounter   Medications    aspirin EC tablet 81 mg    atorvastatin (LIPITOR) tablet 40 mg    heparin (porcine) injection 4,000 Units    heparin (porcine) injection 4,000 Units    heparin (porcine) injection 2,000 Units    heparin 25,000 units in dextrose 5% 250 mL (premix) infusion    carvedilol (COREG) tablet 3.125 mg       DDX: RCA stenosis, ACS    DIAGNOSTIC RESULTS / EMERGENCY DEPARTMENT COURSE / MDM   LAB RESULTS:  Results for orders placed or performed during the hospital encounter of 11/06/21   COVID-19, Rapid    Specimen: Nasopharyngeal Swab   Result Value Ref Range    Specimen Description . NASOPHARYNGEAL SWAB     SARS-CoV-2, Rapid Not Detected Not Detected   CBC Auto Differential   Result Value Ref Range    WBC 5.9 3.5 - 11.3 k/uL    RBC 4.67 4.21 - 5.77 m/uL    Hemoglobin 15.6 13.0 - 17.0 g/dL Hematocrit 45.8 40.7 - 50.3 %    MCV 98.1 82.6 - 102.9 fL    MCH 33.4 25.2 - 33.5 pg    MCHC 34.1 28.4 - 34.8 g/dL    RDW 13.1 11.8 - 14.4 %    Platelets 762 775 - 375 k/uL    MPV 10.0 8.1 - 13.5 fL    NRBC Automated 0.0 0.0 per 100 WBC    Differential Type NOT REPORTED     Seg Neutrophils 50 36 - 65 %    Lymphocytes 33 24 - 43 %    Monocytes 13 (H) 3 - 12 %    Eosinophils % 2 1 - 4 %    Basophils 2 0 - 2 %    Immature Granulocytes 0 0 %    Segs Absolute 2.92 1.50 - 8.10 k/uL    Absolute Lymph # 1.94 1.10 - 3.70 k/uL    Absolute Mono # 0.77 0.10 - 1.20 k/uL    Absolute Eos # 0.13 0.00 - 0.44 k/uL    Basophils Absolute 0.09 0.00 - 0.20 k/uL    Absolute Immature Granulocyte <0.03 0.00 - 0.30 k/uL    WBC Morphology NOT REPORTED     RBC Morphology NOT REPORTED     Platelet Estimate NOT REPORTED    Basic Metabolic Panel w/ Reflex to MG   Result Value Ref Range    Glucose 93 70 - 99 mg/dL    BUN 16 8 - 23 mg/dL    CREATININE 0.88 0.70 - 1.20 mg/dL    Bun/Cre Ratio NOT REPORTED 9 - 20    Calcium 9.4 8.6 - 10.4 mg/dL    Sodium 138 135 - 144 mmol/L    Potassium 4.2 3.7 - 5.3 mmol/L    Chloride 103 98 - 107 mmol/L    CO2 21 20 - 31 mmol/L    Anion Gap 14 9 - 17 mmol/L    GFR Non-African American >60 >60 mL/min    GFR African American >60 >60 mL/min    GFR Comment          GFR Staging NOT REPORTED    Troponin   Result Value Ref Range    Troponin, High Sensitivity 10 0 - 22 ng/L    Troponin T NOT REPORTED <0.03 ng/mL    Troponin Interp NOT REPORTED    Brain Natriuretic Peptide   Result Value Ref Range    Pro-BNP 92 <300 pg/mL    BNP Interpretation NOT REPORTED        RADIOLOGY:  CTA CORON EJECT [facility] WALL MOTION    Result Date: 11/5/2021  1. Soft plaquing is seen involving the mid RCA causing 75-90% stenosis. Further evaluation and treatment with PTCA is suggested. 2. Calcification involving the proximal left circumflex artery causing less than 50% stenosis. 3. Left ventricular ejection fraction is normal at 65%.  The findings were sent to the Radiology Results Communication Center at 3:49 pm on 11/5/2021to be communicated to a licensed caregiver. EKG Interpretation    Interpreted by myself    Rhythm: normal sinus   Rate: normal  Axis: normal  Ectopy: none  Conduction: normal  ST Segments: normal  T Waves: normal  Q Waves: none    Clinical Impression: no acute changes    All EKG's are interpreted by the Emergency Department Physician who either signs or Co-signs this chart in the absence of a cardiologist.    EMERGENCY DEPARTMENT COURSE:  ED Course as of 11/06/21 1237   Sat Nov 06, 2021   1056 Patient evaluated bedside. Patient's vitals are stable. Patient denies any symptoms at all. CTA findings will get cardiac labs as well as a basic labs. We will also consult cardiology. Will get rapid Covid as patient will need to be admitted [ZE]   1206 No ischemic changes on EKG. Troponin 10  BNP 92. Admit patient to medicine team. [ZE]   [phone] with cardiology. Will admit patient and plan for cath on Monday. Will start heparin, statin, aspirin [ZE]      ED Course User Index  [ZE] Rory Rao DO       PROCEDURES:  Procedures     CONSULTS:  IP CONSULT TO CARDIOLOGY    CRITICAL CARE:  None    MDM  Patient presenting for abnormal findings on CTA yesterday. Patient noted to have 75 to 90% blockage of RCA. Patient does not have any ischemic changes noted on cardiac biomarkers or EKG. Patient is asymptomatic. Cardiology will do cardiac catheterization on Monday. Will admit patient to hobs unit    FINAL IMPRESSION      1. Coronary artery disease involving native coronary artery of native heart without angina pectoris        DISPOSITION / PLAN     DISPOSITION  Admit to obs      PATIENT REFERRED TO:  No follow-up provider specified.     DISCHARGE MEDICATIONS:  New Prescriptions    No medications on file       Rogerio Santizo DO  Emergency Medicine Resident    (Please note that portions of thisnote were completed with a voice recognition program.  Efforts were made to edit the dictations but occasionally words are mis-transcribed.)        Lonnie Cabezas DO  Resident  11/06/21 1026

## 2021-11-06 NOTE — ED NOTES
Pt placed on cardiac monitor, BP cuff, and pulse ox. Alarms set.       Corey Parks RN  11/06/21 8275

## 2021-11-06 NOTE — ED PROVIDER NOTES
[facility]     Emergency Department     Faculty Attestation        I performed a history and physical examination of the patient and discussed management with the resident. I reviewed the residents note and agree with the documented findings and plan of care. Any areas of disagreement are noted on the chart. I was personally present for the key portions of any procedures. I have documented in the chart those procedures where I was not present during the key portions. I have reviewed the emergency nurses triage note. I agree with the chief complaint, past medical history, past surgical history, allergies, medications, social and family history as documented unless otherwise noted below. For mid-level providers such as nurse practitioners as well as physicians assistants:    I have personally seen and evaluated the patient. I find the patient's history and physical exam are consistent with NP/PA documentation. I agree with the care provided, treatment rendered, disposition, & follow-up plan. Additional findings are as noted. Vital Signs: BP (!) 154/88   Pulse 76   Temp 97.5 °F (36.4 °C)   Resp 20   Ht 6' 2\" (1.88 m)   Wt 230 lb (104.3 kg)   SpO2 99%   BMI 29.53 kg/m²   PCP:  Daniele Hay MD    Pertinent Comments:     Recent outpatient CT which showed a 75 to 90% stenosis in the mid right RCA. He was having syncopal episodes which warranted this work-up. He denies any active anginal symptoms now is resting comfortably no acute distress. Will check labs including troponin EKG, cardiology consultation, admission for cath.       Critical Care  None          Norma Wakefield MD    Attending Emergency Medicine Physician              Viviana Schwartz MD  11/06/21 3676

## 2021-11-06 NOTE — ED NOTES
Meal tray delivered to bedside. Pt update on bed placement, awaiting transport.       Angela Valdez RN  11/06/21 8314

## 2021-11-06 NOTE — ED NOTES
Pt to ed, sent by Dr. Marylen Reap, cardiology. Pt states 2 weeks ago he had isolated episode of mid sternal chest pain that lasted 20 seconds in duration. Pt states he has had multiple stress tests in the past and all NEG. Pt did have a syncopal episode 1 week ago while walking his dog. Pt had ct scan of his chest yesterday and was called by Dr. Marylen Reap today, told to come to ER due to having 90% blockage in one of his vessels. Pt denies chest pain, sob or any symptoms at this time. Pt is alert, oriented, speaking in full, complete sentences. Pt denies any pain.       Dasha Ahumada, RN  11/06/21 2563

## 2021-11-06 NOTE — ED NOTES
Pt resting on cot, alert, oriented, no distress noted at this time. Pt updated on plan for admission, awaiting bed placement. Pt updated on limited beds available and will notify when placed in a room. Pt denies any needs at this time, will continue with plan of care.       Marti Perales RN  11/06/21 0077

## 2021-11-06 NOTE — ED NOTES
Admitting team contacted regarding coreg order. Pt does not currently take it as a home medication and prior to starting wanted to clarify order.       William Mathur RN  11/06/21 7257

## 2021-11-07 ENCOUNTER — APPOINTMENT (OUTPATIENT)
Dept: CT IMAGING | Age: 61
DRG: 247 | End: 2021-11-07
Payer: COMMERCIAL

## 2021-11-07 PROBLEM — I20.0 UNSTABLE ANGINA (HCC): Status: ACTIVE | Noted: 2021-11-07

## 2021-11-07 LAB
PARTIAL THROMBOPLASTIN TIME: 41.6 SEC (ref 20.5–30.5)
PARTIAL THROMBOPLASTIN TIME: 48.7 SEC (ref 20.5–30.5)
PARTIAL THROMBOPLASTIN TIME: 53.6 SEC (ref 20.5–30.5)

## 2021-11-07 PROCEDURE — 6370000000 HC RX 637 (ALT 250 FOR IP): Performed by: STUDENT IN AN ORGANIZED HEALTH CARE EDUCATION/TRAINING PROGRAM

## 2021-11-07 PROCEDURE — 85730 THROMBOPLASTIN TIME PARTIAL: CPT

## 2021-11-07 PROCEDURE — 6360000002 HC RX W HCPCS: Performed by: STUDENT IN AN ORGANIZED HEALTH CARE EDUCATION/TRAINING PROGRAM

## 2021-11-07 PROCEDURE — 70450 CT HEAD/BRAIN W/O DYE: CPT

## 2021-11-07 PROCEDURE — 94761 N-INVAS EAR/PLS OXIMETRY MLT: CPT

## 2021-11-07 PROCEDURE — 2580000003 HC RX 258: Performed by: STUDENT IN AN ORGANIZED HEALTH CARE EDUCATION/TRAINING PROGRAM

## 2021-11-07 PROCEDURE — 2500000003 HC RX 250 WO HCPCS: Performed by: STUDENT IN AN ORGANIZED HEALTH CARE EDUCATION/TRAINING PROGRAM

## 2021-11-07 PROCEDURE — 36415 COLL VENOUS BLD VENIPUNCTURE: CPT

## 2021-11-07 PROCEDURE — 2060000000 HC ICU INTERMEDIATE R&B

## 2021-11-07 RX ADMIN — ATORVASTATIN CALCIUM 40 MG: 80 TABLET, FILM COATED ORAL at 20:05

## 2021-11-07 RX ADMIN — FAMOTIDINE 20 MG: 10 INJECTION INTRAVENOUS at 08:28

## 2021-11-07 RX ADMIN — FAMOTIDINE 20 MG: 10 INJECTION INTRAVENOUS at 20:05

## 2021-11-07 RX ADMIN — HEPARIN SODIUM 2000 UNITS: 1000 INJECTION, SOLUTION INTRAVENOUS; SUBCUTANEOUS at 19:55

## 2021-11-07 RX ADMIN — HEPARIN SODIUM 2000 UNITS: 1000 INJECTION, SOLUTION INTRAVENOUS; SUBCUTANEOUS at 05:35

## 2021-11-07 RX ADMIN — SODIUM CHLORIDE, PRESERVATIVE FREE 5 ML: 5 INJECTION INTRAVENOUS at 20:06

## 2021-11-07 RX ADMIN — ASPIRIN 81 MG: 81 TABLET, COATED ORAL at 08:28

## 2021-11-07 RX ADMIN — HEPARIN SODIUM AND DEXTROSE 16 UNITS/KG/HR: 10000; 5 INJECTION INTRAVENOUS at 19:54

## 2021-11-07 RX ADMIN — DILTIAZEM HYDROCHLORIDE 240 MG: 240 CAPSULE, COATED, EXTENDED RELEASE ORAL at 08:28

## 2021-11-07 RX ADMIN — SODIUM CHLORIDE, PRESERVATIVE FREE 10 ML: 5 INJECTION INTRAVENOUS at 08:30

## 2021-11-07 ASSESSMENT — ENCOUNTER SYMPTOMS
SINUS PAIN: 0
ALLERGIC/IMMUNOLOGIC NEGATIVE: 1
ABDOMINAL PAIN: 0
EYE ITCHING: 0
VOMITING: 0
NAUSEA: 1
SHORTNESS OF BREATH: 0
RHINORRHEA: 0
DIARRHEA: 0
EYE DISCHARGE: 0
ABDOMINAL DISTENTION: 0
BACK PAIN: 0
CHOKING: 0
CHEST TIGHTNESS: 1
FACIAL SWELLING: 0
EYE PAIN: 0
EYE REDNESS: 0
APNEA: 0
STRIDOR: 0
VOICE CHANGE: 0
PHOTOPHOBIA: 0
WHEEZING: 0
COLOR CHANGE: 0
EYES NEGATIVE: 1
CONSTIPATION: 0
COUGH: 1

## 2021-11-07 ASSESSMENT — PAIN SCALES - GENERAL
PAINLEVEL_OUTOF10: 0

## 2021-11-07 NOTE — PROGRESS NOTES
[facility]  Internal Medicine Teaching Residency Program  Inpatient Daily Progress Note  ______________________________________________________________________________    Patient: W[place] Headings  YOB: 1960   EUI:[id number]    Acct: [de-identified]     Room: 2006/2006-01  Admit date: 11/6/2021  Today's date: 11/07/21  Number of days in the hospital: 1    SUBJECTIVE   Admitting Diagnosis: <principal problem not specified>  CC: chest pain and syncopal episodes  Pt seen & examined at bedside. Chart & results reviewed. Vitals stable. No acute events overnight. Patient denies any shortness of breath, chest pain, palpitations, or blurry vision. Ischemic work-up is planned on Monday. He states that he hit his head during syncopal episodes. Trauma work-up/CT head was not done. Will order CT head without contrast prior to going for PTCA+/- CEE. ROS:  Constitutional:  negative for chills, fevers, sweats  Respiratory:  negative for cough, dyspnea on exertion, hemoptysis, shortness of breath, wheezing  Cardiovascular:  negative for chest pain, chest pressure/discomfort, lower extremity edema, palpitations  Gastrointestinal:  negative for abdominal pain, constipation, diarrhea, nausea, vomiting  Neurological:  negative for dizziness, headache  BRIEF HISTORY   A 64 y.o. male with PMH significant of HTN, HLD, presented with intermittent episodes of chest pain. He is found to have a positive CT angiography revealing 75 to 90% stenosis in mid RCA. He is being evaluated by his cardiologist for recurrent episodes of unstable angina and syncope. He was recommended to come to ED for further work-up. Patient states that he has been having intermittent episodes of chest discomfort for past at least 2 weeks. These chest pain episodes last couple of seconds, pain is nonradiating, dull pressure-like, associated with no aggravating or relieving factors.   He also reports 2-3 episodes loss of consciousness. Onset was 2 week ago. Symptoms have  resolved since that time. Patient describes the episode as a sudden loss of consciousness without warning. Associated symptoms: nausea and tachycardia/palpitations. The patient denies history of CAD. Medications putting patient at risk for syncope: calcium channel blockers. He is never diagnosed of any heart disease. He had negative cardiac stress test 4 years back after an episode of chest pain lasting >20 mins. He has family history significant of PTCA with CEE in his father in the age of 45s. Cardiology is consulted, and started on heparin drip.     Work-up in the ED,  CBC-unremarkable  proBNP 92  Troponin 10  EKG showed no acute ST or T wave abnormality. On my evaluation,  Patient is hemodynamically stable, saturating well on room air. Currently denies any chest pain/shortness of breath/palpitation/dizziness. He is on heparin drip. OBJECTIVE     Vital Signs:  /78   Pulse 62   Temp 98.3 °F (36.8 °C) (Oral)   Resp 16   Ht 6' 2\" (1.88 m)   Wt 230 lb (104.3 kg)   SpO2 94%   BMI 29.53 kg/m²     Temp (24hrs), Av.9 °F (36.6 °C), Min:97.7 °F (36.5 °C), Max:98.3 °F (36.8 °C)    In: -   Out: 1000 [Urine:1000]    Physical Exam:  Constitutional: This is a well developed, well nourished, 25-29.9 - Overweight 64y.o. year old male who is alert, oriented, cooperative and in no apparent distress. Head:normocephalic and atraumatic. EENT:  PERRLA. No conjunctival injections. Septum was midline, mucosa was without erythema, exudates or cobblestoning. No thrush was noted. Neck: Supple without thyromegaly. No elevated JVP. Trachea was midline. Respiratory: Chest was symmetrical without dullness to percussion. Breath sounds bilaterally were clear to auscultation. There were no wheezes, rhonchi or rales. There is no intercostal retraction or use of accessory muscles. No egophony noted.    Cardiovascular: Regular without murmur, clicks, gallops or rubs. Abdomen: Slightly rounded and soft without organomegaly. No rebound, rigidity or guarding was appreciated. Lymphatic: No lymphadenopathy. Musculoskeletal: Normal curvature of the spine. No gross muscle weakness. Extremities:  No lower extremity edema, ulcerations, tenderness, varicosities or erythema. Muscle size, tone and strength are normal.  No involuntary movements are noted. Skin:  Warm and dry. Good color, turgor and pigmentation. No lesions or scars. No cyanosis or clubbing  Neurological/Psychiatric: The patient's general behavior, level of consciousness, thought content and emotional status is normal.        Medications:  Scheduled Medications:    famotidine (PEPCID) injection  20 mg IntraVENous BID    aspirin  81 mg Oral Daily    atorvastatin  40 mg Oral Nightly    carvedilol  3.125 mg Oral BID WC    sodium chloride flush  5-40 mL IntraVENous 2 times per day    dilTIAZem  240 mg Oral Daily     Continuous Infusions:    heparin (PORCINE) Infusion 14 Units/kg/hr (11/07/21 0534)    sodium chloride       PRN Medicationsheparin (porcine), 4,000 Units, PRN  heparin (porcine), 2,000 Units, PRN  sodium chloride flush, 5-40 mL, PRN  sodium chloride, 25 mL, PRN  potassium chloride, 40 mEq, PRN   Or  potassium alternative oral replacement, 40 mEq, PRN   Or  potassium chloride, 10 mEq, PRN  ondansetron, 4 mg, Q4H PRN  polyethylene glycol, 17 g, Daily PRN  acetaminophen, 650 mg, Q6H PRN   Or  acetaminophen, 650 mg, Q6H PRN        Diagnostic Labs:  CBC:   Recent Labs     11/06/21  1119   WBC 5.9   RBC 4.67   HGB 15.6   HCT 45.8   MCV 98.1   RDW 13.1        BMP:   Recent Labs     11/05/21  1200 11/06/21  1119   NA  --  138   K  --  4.2   CL  --  103   CO2  --  21   BUN 14 16   CREATININE 1.03 0.88     BNP: No results for input(s): BNP in the last 72 hours. PT/INR: No results for input(s): PROTIME, INR in the last 72 hours.   APTT:   Recent Labs 11/06/21 2051 11/07/21  0332 11/07/21  1137   APTT 34.4* 41.6* 53.6*     CARDIAC ENZYMES: No results for input(s): CKMB, CKMBINDEX, TROPONINI in the last 72 hours. Invalid input(s): CKTOTAL;3  FASTING LIPID PANEL:  Lab Results   Component Value Date    CHOL 306 (H) 08/16/2021    HDL 44 08/16/2021    TRIG 160 (H) 08/16/2021     LIVER PROFILE: No results for input(s): AST, ALT, ALB, BILIDIR, BILITOT, ALKPHOS in the last 72 hours. MICROBIOLOGY: No results found for: CULTURE    Imaging:    CTA CORON EJECT FRAC WALL MOTION    Result Date: 11/5/2021  1. Soft plaquing is seen involving the mid RCA causing 75-90% stenosis. Further evaluation and treatment with PTCA is suggested. 2. Calcification involving the proximal left circumflex artery causing less than 50% stenosis. 3. Left ventricular ejection fraction is normal at 65%. The findings were sent to the Radiology Results [address] at 3:49 pm on 11/5/2021to be communicated to a licensed caregiver. ASSESSMENT & PLAN     IMPRESSION  This is a 64 y.o. male who presented with recurrent chest pain and syncopal episodes and recent RTA with significant obstructive disease. He is admitted for ischemia work-up for CAD with possible cath.     CAD in native artery  CT coronary angiography showed soft plaque involving mid RCA with 75 to 90% stenosis and calcification of left proximal circumflex artery causing less than 50% stenosis. Cardiology on board  Started on IV heparin  Started on Coreg 3.125 mg  On aspirin and high-dose statin  Tentative plan for cath on Monday unless hemodynamic instability     Hypertension  We will resume Cardizem  mg OD     Mixed hyperlipidemia  On atorvastatin 40 mg     Obesity     DVT ppx: On heparin     GI ppx: On Pepcid     PT/OT/SW:   Consulted     Discharge Planning:    to assist in discharge planning        Tia Dhaliwal  Internal Medicine Resident, PGY-1  [facility];  [place], [place]  11/7/2021, 2:20 PM

## 2021-11-07 NOTE — PROGRESS NOTES
--  138   K  --  4.2   CL  --  103   CO2  --  21   BUN 14 16   CREATININE 1.03 0.88   GLUCOSE  --  93     CBC: )  Recent Labs     11/06/21  1119   WBC 5.9   HGB 15.6   HCT 45.8             Imaging :     CT coronary ejection fraction with wall motion showing soft plaquing involving the upper mid RCA with 75 to 90% stenosis and calcification of the left proximal circumflex artery      Assessment & Plan : Active Problems:    CAD in native artery    Ex-smoker    Mixed hyperlipidemia    Unstable angina ([address]  Resolved Problems:    * No resolved hospital problems. *      Tentative plan for cardiac cath continue with IV heparin for signs of bleeding, hemodynamically stable continue with diltiazem for hypertension monitor for now follow-up on cardiology recommendations. Patient likely will have cardiac cath on Monday might be able to eat tomorrow morning patient diet    Discontinue IV fluids, continue on IV heparin  Resume Home medications as tolerated by the patient. Diet : N.p.o. for now, resume diet tomorrow no plans on Sunday  GI prophylaxis : Pepcid  DVT prophylaxis : Heparin    PT & OT: Consulted   : To assist in discharge planning. Christianne Cheadle, MD  Internal Medicine Resident, PGY- [address] [place], [place]  11/7/2021, 12:30 AM

## 2021-11-07 NOTE — PLAN OF CARE
Problem: Falls - Risk of:  Goal: Will remain free from falls  Description: Will remain free from falls  11/7/2021 1044 by Sharath Gant RN  Outcome: Ongoing  11/7/2021 0105 by Comfort Pena RN  Outcome: Ongoing  11/7/2021 0105 by Comfort Pena RN  Outcome: Ongoing  Goal: Absence of physical injury  Description: Absence of physical injury  11/7/2021 1044 by Sharath Gant RN  Outcome: Ongoing  11/7/2021 0105 by Comfort Pena RN  Outcome: Ongoing  11/7/2021 0105 by Comfort Pena RN  Outcome: Ongoing     Problem: Anxiety:  Goal: Level of anxiety will decrease  Description: Level of anxiety will decrease  Outcome: Ongoing     Problem: Tissue Perfusion - Cardiopulmonary, Altered:  Goal: Absence of angina  Description: Absence of angina  Outcome: Ongoing  Goal: Hemodynamic stability will improve  Description: Hemodynamic stability will improve  Outcome: Ongoing     Problem: Infection - Surgical Site:  Goal: Will show no infection signs and symptoms  Description: Will show no infection signs and symptoms  Outcome: Ongoing     Problem: Bleeding:  Goal: Will show no signs and symptoms of excessive bleeding  Description: Will show no signs and symptoms of excessive bleeding  Outcome: Ongoing

## 2021-11-07 NOTE — PROGRESS NOTES
Comprehensive Nutrition Assessment    Type and Reason for Visit:  Positive Nutrition Screen (wt loss)    Nutrition Recommendations/Plan:   - Continue current diet  - Educate pt on HH diet  - Will CTM as low risk    Nutrition Assessment:  Pt seen for admission trigger, wt loss. Per pt, wt loss intentional, ~25 lb in past 4 months. Pt has been following keto/intermittent fasting. His eating pattern has varied from 2 meals per day to eating only 2 days per week. Pt would benefit from [facility]. education. Pt declined outpatient RD recommendation d/t current insurance. Malnutrition Assessment:  Malnutrition Status:  Insufficient data      Estimated Daily Nutrient Needs:  Energy (kcal):  2000 to 2300 kcal/kg (20-22 kcal/kg); Weight Used for Energy Requirements:  Current     Protein (g):  100-110 g/day (1.0-1.1 g/kg); Weight Used for Protein Requirements:  Current        Fluid (ml/day):   ; Method Used for Fluid Requirements:  1 ml/kcal      Nutrition Related Findings:  Labs/meds reviewed      Wounds:  None       Current Nutrition Therapies:    ADULT DIET; Regular; 4 carb choices (60 gm/meal)  Diet NPO    Anthropometric Measures:  · Height: 6' 2\" (188 cm)  · Current Body Weight: 230 lb (104.3 kg)   · Admission Body Weight:      · Usual Body Weight:       · Ideal Body Weight: 190 lbs; % Ideal Body Weight 121.1 %   · BMI: 29.5  · BMI Categories: Overweight (BMI 25.0-29. 9)       Nutrition Diagnosis:   · Inadequate protein-energy intake related to food and nutrition related knowledge deficit as evidenced by weight loss      Nutrition Interventions:   Food and/or Nutrient Delivery:  Continue Current Diet  Nutrition Education/Counseling:  Education needed   Coordination of Nutrition Care:  Continue to monitor while inpatient    Nutrition Monitoring and Evaluation:   Behavioral-Environmental Outcomes:  None Identified   Food/Nutrient Intake Outcomes:  Food and Nutrient Intake  Physical Signs/Symptoms Outcomes: Biochemical Data, Weight     Discharge Planning:     Too soon to determine     Electronically signed by Belen Manley, MS, RD, LD on 11/7/21 at 3:01 PM EST    Contact: [phone]

## 2021-11-07 NOTE — CARE COORDINATION
Case Management Initial Discharge Plan  Freddy Stallworth,             Met with: Patient to discuss discharge plans. Information verified: address, contacts, phone number, , insurance Yes  Insurance Provider: [facility]    Emergency Contact/Next of Kin name & number: Milton Ervin, Mother, [phone]  Who are involved in patient's support system? Pt's mother. PCP: Silvia Morse MD  Date of last visit: 10/21    Discharge Planning    Living Arrangements: With mother    Home has 1 stories  1 stairs to climb to get into front door. Location of bedroom/bathroom in home Main level    Patient able to perform ADL's: Yes    Current Services (outpatient & in home) None  DME equipment: None  DME provider: N/a    Anticoagulation: No    Potential Assistance Needed:  No    Patient agreeable to home care: No  [place] of choice provided:  N/a  Prior SNF/Rehab Placement and Facility: No  Agreeable to SNF/Rehab: No  [place] of choice provided: N/a     Evaluation: No    Patient expects to be discharged to:   Home    If home: is the family and/or caregiver wiling & able to provide support at home? Yes  Who will be providing this support? Pt's mother. Transportation provider: Pt drives, has truck in lot, states that his mother will bring him home, if needed. Transportation arrangements needed for discharge: No.    Readmission Risk              Risk of Unplanned Readmission:  6         Does patient have a readmission risk score greater than 14?: No  If yes, follow-up appointment must be made within 7 days of discharge. Goals of Care: Free of chest pain. Educated pt on transitional options, provided freedom of choice and are agreeable with plan      Discharge Plan: To home.           Electronically signed by Yue López RN on 21 at 3:46 PM EST

## 2021-11-07 NOTE — CONSULTS
Attestation signed by      Attending Physician Statement:    I have discussed the care of  Burke Bales , including pertinent history and exam findings, with the Cardiology fellow/resident. I have seen and examined the patient and the key elements of all parts of the encounter have been performed by me. I agree with the assessment, plan and orders as documented by the fellow/resident, after I modified exam findings and plan of treatments, and the final version is my approved version of the assessment. Additional Comments: The patient was seen and examined, agree with below. CAD on CT angio. Had episodes of chest pain. Currently chest pain free on IV heparin. No acute ECG changes. Discussed proceeding with coronary angiography and possible PCI, benefits, risks and alternatives explained, bleeding, vessel injury, CVA, MI, contrast allergy or nephropathy, death and does agree to proceed. [facility]   Consultation Note               Today's Date: 11/7/2021  Patient Name: Burke Bales  Date of admission: 11/6/2021 10:55 AM  Patient's age: 64 y.o., 1960  Admission Dx: CAD in native artery [I25.10]  Coronary artery disease involving native coronary artery of native heart without angina pectoris [I25.10]    Reason for Consult:  Unstable angina, abnormal cta    Requesting Physician: Kamilah Geronimo MD    CHIEF COMPLAINT:    Chief Complaint   Patient presents with    Other     cardio wants admited CT showed 90% blocked states needs cath    Chest Pain     isolated episode of left sided chest pain 2 weeks ago, lasting 20 seconds in duration.  Loss of Consciousness     syncopal episode last week while walking down. History Obtained From:  patient, electronic medical record    HISTORY OF PRESENT ILLNESS:      The patient is a 64 y.o.  male who was admitted to the hospital with symptoms of intermittent chest pain concerning for angina.  HE had an episode of chest pain a few weeks ago and describes it has chest pressure lasting  few seconds at least while he was sitting. Last week he was walking his dog, felt a knot in his stomach and passed out on his driveway. Reports infrequent palpitations. He underwent a coronary cta recently and was noted to have significant obstructive disease in the RCA. He was advised by his cardiologist to come to the ED given his symptoms and results of CTA. Past Medical History:   has no past medical history on file. Past Surgical History:   has no past surgical history on file. Home Medications:    Prior to Admission medications    Medication Sig Start Date End Date Taking?  Authorizing Provider   dilTIAZem (DILT-XR) 120 MG extended release capsule Take 2 capsules by mouth daily 10/22/21 1/20/22 Yes GAGAN Slater CNP   naproxen (NAPROSYN) 500 MG tablet Take 1 tablet by mouth 2 times daily as needed for Pain 6/16/21  Yes Christiano Tubbs MD   atorvastatin (LIPITOR) 10 MG tablet Take 1 tablet by mouth once daily  Patient not taking: Reported on 6/16/2021 4/8/21   Christiano Tubbs MD   aspirin (EQ ASPIRIN ADULT LOW DOSE) 81 MG EC tablet Take 1 tablet by mouth daily  Patient not taking: Reported on 10/15/2021 1/21/21   Christiano Tubbs MD      Current Facility-Administered Medications: aspirin EC tablet 81 mg, 81 mg, Oral, Daily  atorvastatin (LIPITOR) tablet 40 mg, 40 mg, Oral, Nightly  heparin (porcine) injection 4,000 Units, 4,000 Units, IntraVENous, PRN  heparin (porcine) injection 2,000 Units, 2,000 Units, IntraVENous, PRN  heparin 25,000 units in dextrose 5% 250 mL (premix) infusion, 5-30 Units/kg/hr, IntraVENous, Continuous  carvedilol (COREG) tablet 3.125 mg, 3.125 mg, Oral, BID WC  0.9 % sodium chloride infusion, , IntraVENous, Continuous  sodium chloride flush 0.9 % injection 5-40 mL, 5-40 mL, IntraVENous, 2 times per day  sodium chloride flush 0.9 % injection 5-40 mL, 5-40 mL, IntraVENous, PRN  0.9 % sodium chloride infusion, 25 mL, IntraVENous, PRN  potassium chloride (KLOR-CON M) extended release tablet 40 mEq, 40 mEq, Oral, PRN **OR** potassium bicarb-citric acid (EFFER-K) effervescent tablet 40 mEq, 40 mEq, Oral, PRN **OR** potassium chloride 10 mEq/100 mL IVPB (Peripheral Line), 10 mEq, IntraVENous, PRN  ondansetron (ZOFRAN) injection 4 mg, 4 mg, IntraVENous, Q4H PRN  polyethylene glycol (GLYCOLAX) packet 17 g, 17 g, Oral, Daily PRN  acetaminophen (TYLENOL) tablet 650 mg, 650 mg, Oral, Q6H PRN **OR** acetaminophen (TYLENOL) suppository 650 mg, 650 mg, Rectal, Q6H PRN  dilTIAZem (CARDIZEM CD) extended release capsule 240 mg, 240 mg, Oral, Daily  Facility-Administered Medications Ordered in Other Encounters: 0.9 % sodium chloride infusion, , IntraVENous, Continuous  sodium chloride flush 0.9 % injection 5-40 mL, 5-40 mL, IntraVENous, PRN  sodium chloride flush 0.9 % injection 10 mL, 10 mL, IntraVENous, PRN      Allergies:  Patient has no known allergies. Social History:   reports that he has never smoked. He has never used smokeless tobacco. He reports current alcohol use. He reports that he does not use drugs. Family History: family history is not on file. No h/o sudden cardiac death. No for premature CAD      REVIEW OF SYSTEMS:    · Constitutional: there has been no unanticipated weight loss. · Eyes: No visual changes or diplopia. · ENT: No Headaches  · Cardiovascular: see above  · Respiratory: No previous pulmonary problems, No cough  · Gastrointestinal: No abdominal pain. No change in bowel or bladder habits. · Genitourinary: No dysuria, trouble voiding, or hematuria. · Musculoskeletal:  No gait disturbance, No weakness or joint complaints. · Integumentary: No rash or pruritis.   · Neurological: No headache, diplopia      PHYSICAL EXAM:      /82   Pulse 63   Temp 97.7 °F (36.5 °C) (Oral)   Resp 21   Ht 6' 2\" (1.88 m)   Wt 230 lb (104.3 kg)   SpO2 93%   BMI 29.53 kg/m²    Constitutional and General Appearance: Alert, no distress  Respiratory:  · No increased work of breathing. · Clear to auscultation bilaterally. No wheeze or crackles. Cardiovascular:  · Normal S1 and S2.   · Jugular venous pulsation Normal  Abdomen:   · Soft  · No tenderness  Extremities:  · No lower extremity edema  Neurologic:  · Alert and oriented. · Moves all extremities well      DATA:    Diagnostics:    Labs:     CBC:   Recent Labs     11/06/21  1119   WBC 5.9   HGB 15.6   HCT 45.8        BMP:   Recent Labs     11/05/21  1200 11/06/21  1119   NA  --  138   K  --  4.2   CO2  --  21   BUN 14 16   CREATININE 1.03 0.88   LABGLOM >60 >60   GLUCOSE  --  93     BNP: No results for input(s): BNP in the last 72 hours. PT/INR: No results for input(s): PROTIME, INR in the last 72 hours. APTT:  Recent Labs     11/06/21  1119 11/06/21 2051   APTT 24.6 34.4*     CARDIAC ENZYMES:  Recent Labs     11/06/21  1119   TROPHS 10     No results for input(s): CKTOTAL, CKMB, CKMBINDEX, TROPONINI in the last 72 hours. Invalid input(s):  [address]  Recent Labs     11/06/21  1119   TROPONINT NOT REPORTED     FASTING LIPID PANEL:  Lab Results   Component Value Date    HDL 44 08/16/2021    LDLCALC 230 08/16/2021    TRIG 160 08/16/2021     LIVER PROFILE:No results for input(s): AST, ALT, LABALBU in the last 72 hours. CTA 11/5/21:  1. Soft plaquing is seen involving the mid RCA causing 75-90% stenosis. Further evaluation and treatment with PTCA is suggested. 2. Calcification involving the proximal left circumflex artery causing less   than 50% stenosis. 3. Left ventricular ejection fraction is normal at 65%. IMPRESSION:    1. Possible unstable angina  2. Recent hx of syncope - vagal vs arrhythmogenic  3. Abnormal CTA  4. HTN  5. HLD  6. Hx of smoking  7. Positive family history for CAD his father had MI at age 43      RECOMMENDATIONS:  1.  Given chest pain, syncope and abnormal CTA concern for possible cardiac etiology of syncope. 2. Continue heparin gtt. 3. Continue asa and high intensity statin  4. Telemetry monitoring  5. NPO after midnight for cath on Monday. Thank you for allowing us to participate in [address] [place] [address]. Will follow with you.       Electronically signed on 11/07/21 at 12:14 AM by:    Celestina Amato MD, MD   Fellow, [address]

## 2021-11-08 ENCOUNTER — APPOINTMENT (OUTPATIENT)
Dept: CARDIAC CATH/INVASIVE PROCEDURES | Age: 61
DRG: 247 | End: 2021-11-08
Payer: COMMERCIAL

## 2021-11-08 PROBLEM — R07.9 CHEST PAIN IN ADULT: Status: ACTIVE | Noted: 2021-11-08

## 2021-11-08 PROBLEM — F41.9 ANXIETY: Status: ACTIVE | Noted: 2021-11-08

## 2021-11-08 LAB
% CKMB: 2.5 % (ref 0–3.5)
% CKMB: 2.6 % (ref 0–3.5)
ACTIVATED CLOTTING TIME: 296 SEC (ref 79–149)
CK MB: 1.9 NG/ML
CK MB: 2.3 NG/ML
CKMB INTERPRETATION: NORMAL
CKMB INTERPRETATION: NORMAL
EKG ATRIAL RATE: 63 BPM
EKG P AXIS: 21 DEGREES
EKG P-R INTERVAL: 196 MS
EKG Q-T INTERVAL: 410 MS
EKG QRS DURATION: 94 MS
EKG QTC CALCULATION (BAZETT): 419 MS
EKG R AXIS: 0 DEGREES
EKG T AXIS: 15 DEGREES
EKG VENTRICULAR RATE: 63 BPM
HCT VFR BLD CALC: 46.4 % (ref 40.7–50.3)
HEMOGLOBIN: 15.9 G/DL (ref 13–17)
MCH RBC QN AUTO: 33.4 PG (ref 25.2–33.5)
MCHC RBC AUTO-ENTMCNC: 34.3 G/DL (ref 28.4–34.8)
MCV RBC AUTO: 97.5 FL (ref 82.6–102.9)
NRBC AUTOMATED: 0 PER 100 WBC
PARTIAL THROMBOPLASTIN TIME: 81.5 SEC (ref 20.5–30.5)
PDW BLD-RTO: 12.8 % (ref 11.8–14.4)
PLATELET # BLD: 232 K/UL (ref 138–453)
PMV BLD AUTO: 10.3 FL (ref 8.1–13.5)
RBC # BLD: 4.76 M/UL (ref 4.21–5.77)
TOTAL CK: 76 U/L (ref 39–308)
TOTAL CK: 87 U/L (ref 39–308)
TROPONIN INTERP: NORMAL
TROPONIN INTERP: NORMAL
TROPONIN T: NORMAL NG/ML
TROPONIN T: NORMAL NG/ML
TROPONIN, HIGH SENSITIVITY: 8 NG/L (ref 0–22)
TROPONIN, HIGH SENSITIVITY: 9 NG/L (ref 0–22)
WBC # BLD: 5.7 K/UL (ref 3.5–11.3)

## 2021-11-08 PROCEDURE — 85027 COMPLETE CBC AUTOMATED: CPT

## 2021-11-08 PROCEDURE — 84484 ASSAY OF TROPONIN QUANT: CPT

## 2021-11-08 PROCEDURE — 6360000002 HC RX W HCPCS

## 2021-11-08 PROCEDURE — B2111ZZ FLUOROSCOPY OF MULTIPLE CORONARY ARTERIES USING LOW OSMOLAR CONTRAST: ICD-10-PCS | Performed by: INTERNAL MEDICINE

## 2021-11-08 PROCEDURE — 2709999900 HC NON-CHARGEABLE SUPPLY

## 2021-11-08 PROCEDURE — 2500000003 HC RX 250 WO HCPCS: Performed by: STUDENT IN AN ORGANIZED HEALTH CARE EDUCATION/TRAINING PROGRAM

## 2021-11-08 PROCEDURE — 027034Z DILATION OF CORONARY ARTERY, ONE ARTERY WITH DRUG-ELUTING INTRALUMINAL DEVICE, PERCUTANEOUS APPROACH: ICD-10-PCS | Performed by: INTERNAL MEDICINE

## 2021-11-08 PROCEDURE — 4A023N7 MEASUREMENT OF CARDIAC SAMPLING AND PRESSURE, LEFT HEART, PERCUTANEOUS APPROACH: ICD-10-PCS | Performed by: INTERNAL MEDICINE

## 2021-11-08 PROCEDURE — 6370000000 HC RX 637 (ALT 250 FOR IP): Performed by: STUDENT IN AN ORGANIZED HEALTH CARE EDUCATION/TRAINING PROGRAM

## 2021-11-08 PROCEDURE — C1894 INTRO/SHEATH, NON-LASER: HCPCS

## 2021-11-08 PROCEDURE — 2500000003 HC RX 250 WO HCPCS

## 2021-11-08 PROCEDURE — C1769 GUIDE WIRE: HCPCS

## 2021-11-08 PROCEDURE — B2151ZZ FLUOROSCOPY OF LEFT HEART USING LOW OSMOLAR CONTRAST: ICD-10-PCS | Performed by: INTERNAL MEDICINE

## 2021-11-08 PROCEDURE — 93010 ELECTROCARDIOGRAM REPORT: CPT | Performed by: INTERNAL MEDICINE

## 2021-11-08 PROCEDURE — 82550 ASSAY OF CK (CPK): CPT

## 2021-11-08 PROCEDURE — C1725 CATH, TRANSLUMIN NON-LASER: HCPCS

## 2021-11-08 PROCEDURE — 2580000003 HC RX 258: Performed by: STUDENT IN AN ORGANIZED HEALTH CARE EDUCATION/TRAINING PROGRAM

## 2021-11-08 PROCEDURE — C1887 CATHETER, GUIDING: HCPCS

## 2021-11-08 PROCEDURE — 85730 THROMBOPLASTIN TIME PARTIAL: CPT

## 2021-11-08 PROCEDURE — 93005 ELECTROCARDIOGRAM TRACING: CPT | Performed by: INTERNAL MEDICINE

## 2021-11-08 PROCEDURE — 82553 CREATINE MB FRACTION: CPT

## 2021-11-08 PROCEDURE — 36415 COLL VENOUS BLD VENIPUNCTURE: CPT

## 2021-11-08 PROCEDURE — 6370000000 HC RX 637 (ALT 250 FOR IP)

## 2021-11-08 PROCEDURE — 2060000000 HC ICU INTERMEDIATE R&B

## 2021-11-08 PROCEDURE — 85347 COAGULATION TIME ACTIVATED: CPT

## 2021-11-08 PROCEDURE — 93458 L HRT ARTERY/VENTRICLE ANGIO: CPT | Performed by: INTERNAL MEDICINE

## 2021-11-08 PROCEDURE — 99232 SBSQ HOSP IP/OBS MODERATE 35: CPT | Performed by: INTERNAL MEDICINE

## 2021-11-08 PROCEDURE — 6360000004 HC RX CONTRAST MEDICATION

## 2021-11-08 PROCEDURE — 92928 PRQ TCAT PLMT NTRAC ST 1 LES: CPT | Performed by: INTERNAL MEDICINE

## 2021-11-08 RX ORDER — ACETAMINOPHEN 325 MG/1
650 TABLET ORAL EVERY 4 HOURS PRN
Status: DISCONTINUED | OUTPATIENT
Start: 2021-11-08 | End: 2021-11-09 | Stop reason: HOSPADM

## 2021-11-08 RX ORDER — MORPHINE SULFATE 2 MG/ML
2 INJECTION, SOLUTION INTRAMUSCULAR; INTRAVENOUS ONCE
Status: COMPLETED | OUTPATIENT
Start: 2021-11-08 | End: 2021-11-08

## 2021-11-08 RX ORDER — SODIUM CHLORIDE 9 MG/ML
25 INJECTION, SOLUTION INTRAVENOUS PRN
Status: DISCONTINUED | OUTPATIENT
Start: 2021-11-08 | End: 2021-11-09 | Stop reason: HOSPADM

## 2021-11-08 RX ORDER — SODIUM CHLORIDE 0.9 % (FLUSH) 0.9 %
5-40 SYRINGE (ML) INJECTION EVERY 12 HOURS SCHEDULED
Status: DISCONTINUED | OUTPATIENT
Start: 2021-11-08 | End: 2021-11-09 | Stop reason: HOSPADM

## 2021-11-08 RX ORDER — SODIUM CHLORIDE 0.9 % (FLUSH) 0.9 %
5-40 SYRINGE (ML) INJECTION PRN
Status: DISCONTINUED | OUTPATIENT
Start: 2021-11-08 | End: 2021-11-09 | Stop reason: HOSPADM

## 2021-11-08 RX ADMIN — ATORVASTATIN CALCIUM 40 MG: 80 TABLET, FILM COATED ORAL at 21:10

## 2021-11-08 RX ADMIN — SODIUM CHLORIDE, PRESERVATIVE FREE 10 ML: 5 INJECTION INTRAVENOUS at 21:10

## 2021-11-08 RX ADMIN — FAMOTIDINE 20 MG: 10 INJECTION INTRAVENOUS at 21:10

## 2021-11-08 RX ADMIN — ASPIRIN 81 MG: 81 TABLET, COATED ORAL at 08:21

## 2021-11-08 RX ADMIN — FAMOTIDINE 20 MG: 10 INJECTION INTRAVENOUS at 08:21

## 2021-11-08 RX ADMIN — DILTIAZEM HYDROCHLORIDE 240 MG: 240 CAPSULE, COATED, EXTENDED RELEASE ORAL at 08:21

## 2021-11-08 RX ADMIN — MORPHINE SULFATE 2 MG: 2 INJECTION, SOLUTION INTRAMUSCULAR; INTRAVENOUS at 18:45

## 2021-11-08 RX ADMIN — CARVEDILOL 3.12 MG: 3.12 TABLET, FILM COATED ORAL at 08:21

## 2021-11-08 ASSESSMENT — PAIN SCALES - GENERAL
PAINLEVEL_OUTOF10: 0
PAINLEVEL_OUTOF10: 6
PAINLEVEL_OUTOF10: 0
PAINLEVEL_OUTOF10: 0

## 2021-11-08 NOTE — PLAN OF CARE
Problem: Falls - Risk of:  Goal: Will remain free from falls  Description: Will remain free from falls  11/8/2021 0033 by Nora Aldana RN  Outcome: Ongoing  11/7/2021 1044 by Ines Peguero RN  Outcome: Ongoing  Goal: Absence of physical injury  Description: Absence of physical injury  11/8/2021 0033 by Nora Aldana RN  Outcome: Ongoing  11/7/2021 1044 by Ines Peguero RN  Outcome: Ongoing     Problem: Anxiety:  Goal: Level of anxiety will decrease  Description: Level of anxiety will decrease  11/8/2021 0033 by Nora Aldana RN  Outcome: Ongoing  11/7/2021 1044 by Ines Peguero RN  Outcome: Ongoing     Problem: Tissue Perfusion - Cardiopulmonary, Altered:  Goal: Absence of angina  Description: Absence of angina  11/8/2021 0033 by Nora Aldana RN  Outcome: Ongoing  11/7/2021 1044 by Ines Peguero RN  Outcome: Ongoing  Goal: Hemodynamic stability will improve  Description: Hemodynamic stability will improve  11/8/2021 0033 by Nora Aldana RN  Outcome: Ongoing  11/7/2021 1044 by Ines Peguero RN  Outcome: Ongoing     Problem: Infection - Surgical Site:  Goal: Will show no infection signs and symptoms  Description: Will show no infection signs and symptoms  11/8/2021 0033 by Nora Aldana RN  Outcome: Ongoing  11/7/2021 1044 by Ines Peguero RN  Outcome: Ongoing     Problem: Bleeding:  Goal: Will show no signs and symptoms of excessive bleeding  Description: Will show no signs and symptoms of excessive bleeding  11/8/2021 0033 by Nora Aldana RN  Outcome: Ongoing  11/7/2021 1044 by Ines Peguero RN  Outcome: Ongoing

## 2021-11-08 NOTE — PROGRESS NOTES
Patient admitted, consent signed and questions answered. Patient ready for procedure. Call light to reach with side rails up 2 of 2. Bilateral groin and right wrist clipped. History and physical needs to be completed.

## 2021-11-08 NOTE — OP NOTE
[facility]    CARDIAC CATHETERIZATION    Date:   11/8/2021  Patient name:  Cathryn Calabrese  Date of admission:  11/6/2021 10:55 AM  MRN:   [id number]  YOB: 1960    Operators:  Primary:   Valerie Ortega MD (Attending Physician)    Assistant/CV fellow:  Flonnie Kussmaul, MD      Procedure performed:     [x] Left Heart Catheterization. [] Graft Angiography. [x] Left Ventriculography. [] Right Heart Catheterization. [x] Coronary Angiography. [] Aortic Valve Studies. [x] PCI:      [] Other:       Pre Procedure Conscious Sedation Data:  ASA Class:    [] I [x] II [] III [] IV    Mallampati Class:  [] I [x] II [] III [] IV      Indication:  [x] Abnormal CTA      [] + Stress test  [] ACS      [] + EKG Changes  [] Non Q MI       [x] Significant Risk Factors  [x] Recurrent Angina             [] Diabetes Mellitus    [] New LBBB      [] Uncontrolled HTN. [] CHF / Low EF changes     [] Abnormal CTA / Ca Score      Procedure:  Access:  [] Femoral  [x] Radial  artery       [x] Right  [] Left    Procedure: After informed consent was obtained with explanation of the risks and benefits, patient was brought to the cath lab. The access area was prepped and draped in sterile fashion. 1% lidocaine was used for local block. The artery was cannulated with 6  Fr sheath with brisk arterial blood return. The side port was frequently flushed and aspirated with normal saline. Findings:    LMCA: Normal 0% stenosis. LAD: Normal 0% stenosis. LCx: Normal 0% stenosis. RCA: Normal 0% stenosis. mid area 90% stenosis, calcified. Lesion on Mid RCA: Mid subsection. 85% stenosis 8 mm length reduced to     0%. Post Procedure BEN III flow was present. The lesion was diagnosed as     Moderate Risk (B). Devices used         - Luge Wire 182 cm. Number of passes: 1.         - Euphora Balloon 4.0mm x 15mm. 3 inflation(s) to a max pressure of: 11     bimal. - MicroPort Study Stent CEE. 1 inflation(s) to a max pressure of: 14     bimal. Coronary Tree        Dominance: Right         The LV gram was performed in the SANDHU 30 position. LVEF: 55%. Estimated Blood Loss:            [x] Minimal 10-25 cc   [] Minimal < 25 cc      [] Moderate 25-50 cc         [] >50 cc     Conclusions        Procedure Summary        Single vessel CAD    Mid RCA 85% stenosis    Successful PTCA -CEE mid RCA    Preserved LV function        Recommendations        Post stent protocol         Electronically signed on 11/8/2021 at 4:07 PM by:    Louise Chaudhry MD  Fellow, [address]    I have reviewed the case / procedure with resident / fellow  I have examined the patient personally  Patient agree with treatment plan as discussed before, final arrangement based on my evaluation and exam.    Risk and benefit of procedure planned were explained in details. Procedure was performed by me personally, with all aspect of the procedure being done using standard protocol. Note was modified based on my own assessment and treatment.     Zunilda Braden MD  [facility]

## 2021-11-08 NOTE — PLAN OF CARE
Problem: Falls - Risk of:  Goal: Will remain free from falls  Description: Will remain free from falls  11/8/2021 1120 by Israel Blount RN  Outcome: Ongoing  11/8/2021 0033 by Ibis Crowe RN  Outcome: Ongoing  Goal: Absence of physical injury  Description: Absence of physical injury  11/8/2021 1120 by Israel Blount RN  Outcome: Ongoing  11/8/2021 0033 by Ibis Crowe RN  Outcome: Ongoing     Problem: Anxiety:  Goal: Level of anxiety will decrease  Description: Level of anxiety will decrease  11/8/2021 1120 by Israel Blount RN  Outcome: Ongoing  11/8/2021 0033 by Ibis Crowe RN  Outcome: Ongoing     Problem: Tissue Perfusion - Cardiopulmonary, Altered:  Goal: Absence of angina  Description: Absence of angina  11/8/2021 1120 by Israel Blount RN  Outcome: Ongoing  11/8/2021 0033 by Ibis Crowe RN  Outcome: Ongoing  Goal: Hemodynamic stability will improve  Description: Hemodynamic stability will improve  11/8/2021 1120 by Israel Blount RN  Outcome: Ongoing  11/8/2021 0033 by Ibis Crowe RN  Outcome: Ongoing     Problem: Infection - Surgical Site:  Goal: Will show no infection signs and symptoms  Description: Will show no infection signs and symptoms  11/8/2021 1120 by Israel Blount RN  Outcome: Ongoing  11/8/2021 0033 by Ibis Crowe RN  Outcome: Ongoing     Problem: Bleeding:  Goal: Will show no signs and symptoms of excessive bleeding  Description: Will show no signs and symptoms of excessive bleeding  11/8/2021 1120 by Israel Blount RN  Outcome: Ongoing  11/8/2021 0033 by Ibis Crowe RN  Outcome: Ongoing

## 2021-11-08 NOTE — PROGRESS NOTES
[facility]  Internal Medicine Teaching Residency Program  Inpatient Daily Progress Note  ______________________________________________________________________________    Patient: Lena Kelly  YOB: 1960   NSE:[id number]    Acct: [de-identified]     Room: 2006/2006-01  Admit date: 11/6/2021  Today's date: 11/08/21  Number of days in the hospital: 2    SUBJECTIVE   Admitting Diagnosis: <principal problem not specified>  CC: Chest pain and syncopal episodes    No acute episodes overnight  Patient is heme stable and afebrile  CT head w/o contrast negative  Plan for cardiac cath today     ROS:  Constitutional:  negative for chills, fevers, sweats  Respiratory:  negative for cough, dyspnea on exertion, hemoptysis, shortness of breath, wheezing  Cardiovascular:  negative for chest pain, chest pressure/discomfort, lower extremity edema, palpitations  Gastrointestinal:  negative for abdominal pain, constipation, diarrhea, nausea, vomiting  Neurological:  negative for dizziness, headache    BRIEF HISTORY   A 64 y.o. male with PMH significant of HTN, HLD, presented with intermittent episodes of chest pain. He is found to have a positive CT angiography revealing 75 to 90% stenosis in mid RCA. He is being evaluated by his cardiologist for recurrent episodes of unstable angina and syncope. He was recommended to come to ED for further work-up. Patient states that he has been having intermittent episodes of chest discomfort for past at least 2 weeks. These chest pain episodes last couple of seconds, pain is nonradiating, dull pressure-like, associated with no aggravating or relieving factors. He also reports 2-3 episodes loss of consciousness. Onset was 2 week ago. Symptoms have  resolved since that time. Patient describes the episode as a sudden loss of consciousness without warning. Associated symptoms: nausea and tachycardia/palpitations.  The patient denies history of CAD. Medications putting patient at risk for syncope: calcium channel blockers. He is never diagnosed of any heart disease. He had negative cardiac stress test 4 years back after an episode of chest pain lasting >20 mins. He has family history significant of PTCA with CEE in his father in the age of 45s. Cardiology is consulted, and started on heparin drip.     Work-up in the ED,  CBC-unremarkable  proBNP 92  Troponin 10  EKG showed no acute ST or T wave abnormality. On my evaluation,  Patient is hemodynamically stable, saturating well on room air. Currently denies any chest pain/shortness of breath/palpitation/dizziness. He is on heparin drip. OBJECTIVE     Vital Signs:  BP (!) 141/94   Pulse 66   Temp 97.7 °F (36.5 °C) (Oral)   Resp 10   Ht 6' 2\" (1.88 m)   Wt 231 lb 4.8 oz (104.9 kg)   SpO2 99%   BMI 29.70 kg/m²     Temp (24hrs), Av.9 °F (36.6 °C), Min:97.7 °F (36.5 °C), Max:98.4 °F (36.9 °C)    In: -   Out: 1450 [Urine:1450]    Physical Exam:  Constitutional: This is a well developed, well nourished, 25-29.9 - Overweight 64y.o. year old male who is alert, oriented, cooperative and in no apparent distress. Respiratory: Chest was symmetrical without dullness to percussion. Breath sounds bilaterally were clear to auscultation. There were no wheezes, rhonchi or rales. There is no intercostal retraction or use of accessory muscles. No egophony noted. Cardiovascular: Regular without murmur, clicks, gallops or rubs. Abdomen: Slightly rounded and soft without organomegaly. No rebound, rigidity or guarding was appreciated. Lymphatic: No lymphadenopathy. Musculoskeletal: Normal curvature of the spine. No gross muscle weakness. Extremities:  No lower extremity edema, ulcerations, tenderness, varicosities or erythema. Muscle size, tone and strength are normal.  No involuntary movements are noted. Skin:  Warm and dry. Good color, turgor and pigmentation. No lesions or scars.   No cyanosis or clubbing  Neurological/Psychiatric: The patient's general behavior, level of consciousness, thought content and emotional status is normal.        Medications:  Scheduled Medications:    famotidine (PEPCID) injection  20 mg IntraVENous BID    aspirin  81 mg Oral Daily    atorvastatin  40 mg Oral Nightly    carvedilol  3.125 mg Oral BID WC    sodium chloride flush  5-40 mL IntraVENous 2 times per day    dilTIAZem  240 mg Oral Daily     Continuous Infusions:    heparin (PORCINE) Infusion 14 Units/kg/hr (11/08/21 0502)    sodium chloride       PRN Medicationsheparin (porcine), 4,000 Units, PRN  heparin (porcine), 2,000 Units, PRN  sodium chloride flush, 5-40 mL, PRN  sodium chloride, 25 mL, PRN  potassium chloride, 40 mEq, PRN   Or  potassium alternative oral replacement, 40 mEq, PRN   Or  potassium chloride, 10 mEq, PRN  ondansetron, 4 mg, Q4H PRN  polyethylene glycol, 17 g, Daily PRN  acetaminophen, 650 mg, Q6H PRN   Or  acetaminophen, 650 mg, Q6H PRN      Diagnostic Labs:  CBC:   Recent Labs     11/06/21  1119 11/08/21  1159   WBC 5.9 5.7   RBC 4.67 4.76   HGB 15.6 15.9   HCT 45.8 46.4   MCV 98.1 97.5   RDW 13.1 12.8    232     BMP:   Recent Labs     11/06/21  1119      K 4.2      CO2 21   BUN 16   CREATININE 0.88     BNP: No results for input(s): BNP in the last 72 hours. PT/INR: No results for input(s): PROTIME, INR in the last 72 hours. APTT:   Recent Labs     11/07/21  1137 11/07/21  1843 11/08/21  0251   APTT 53.6* 48.7* 81.5*     CARDIAC ENZYMES:   Recent Labs     11/08/21  1159   CKMB 2.3     FASTING LIPID PANEL:  Lab Results   Component Value Date    CHOL 306 (H) 08/16/2021    HDL 44 08/16/2021    TRIG 160 (H) 08/16/2021     LIVER PROFILE: No results for input(s): AST, ALT, ALB, BILIDIR, BILITOT, ALKPHOS in the last 72 hours. MICROBIOLOGY: No results found for: CULTURE    Imaging:    CTA CORON EJECT FRAC WALL MOTION    Result Date: 11/5/2021  1.  Soft plaquing is seen involving the mid RCA causing 75-90% stenosis. Further evaluation and treatment with PTCA is suggested. 2. Calcification involving the proximal left circumflex artery causing less than 50% stenosis. 3. Left ventricular ejection fraction is normal at 65%. The findings were sent to the Radiology Results [address] at 3:49 pm on 11/5/2021to be communicated to a licensed caregiver. ASSESSMENT & PLAN     IMPRESSION  This is a 64 y.o. male who presented with recurrent chest pain and syncopal episodes and recent RTA with significant obstructive disease. He is admitted for ischemia work-up for CAD with possible cath.     CAD in native artery  CT coronary angiography showed soft plaque involving mid RCA with 75 to 90% stenosis and calcification of left proximal circumflex artery causing less than 50% stenosis. Cardiology on board. Recommendations appreciated. Plan for Cardiac Cath today      Hypertension  Cardizem  mg PO  Coreg 3.125 mg BID      Mixed hyperlipidemia  Lipitor 40 mg     DVT ppx: Heparin gtt  GI ppx: Pepcid     PT/OT/SW: Consulted pending cardiac cath      Discharge Planning:  to assist in discharge planning    Jagjit Ruth MD  PGY-2, Internal Medicine Resident  [address] [facility]  11/8/2021 3:03 PM    Attending Physician Statement  I have discussed the care of Jeannie Vela with the resident team.  I have reviewed the key elements of all parts of the encounter with the resident. I agree with the assessment, plan and orders as documented by the resident. Active Problems:    Ex-smoker    Mixed hyperlipidemia    CAD in native artery    Unstable angina (HCC)    Chest pain in adult    Anxiety  Resolved Problems:    * No resolved hospital problems.  *      Electronically signed by Kaela Zhang MD

## 2021-11-09 VITALS
HEIGHT: 74 IN | HEART RATE: 73 BPM | RESPIRATION RATE: 21 BRPM | WEIGHT: 231.3 LBS | OXYGEN SATURATION: 96 % | BODY MASS INDEX: 29.69 KG/M2 | TEMPERATURE: 99 F | DIASTOLIC BLOOD PRESSURE: 78 MMHG | SYSTOLIC BLOOD PRESSURE: 139 MMHG

## 2021-11-09 LAB
% CKMB: 2.8 % (ref 0–3.5)
ANION GAP SERPL CALCULATED.3IONS-SCNC: 13 MMOL/L (ref 9–17)
BUN BLDV-MCNC: 17 MG/DL (ref 8–23)
BUN/CREAT BLD: ABNORMAL (ref 9–20)
CALCIUM SERPL-MCNC: 9.3 MG/DL (ref 8.6–10.4)
CHLORIDE BLD-SCNC: 105 MMOL/L (ref 98–107)
CK MB: 1.9 NG/ML
CKMB INTERPRETATION: NORMAL
CO2: 19 MMOL/L (ref 20–31)
CREAT SERPL-MCNC: 0.86 MG/DL (ref 0.7–1.2)
EKG ATRIAL RATE: 56 BPM
EKG ATRIAL RATE: 65 BPM
EKG P AXIS: 14 DEGREES
EKG P AXIS: 21 DEGREES
EKG P-R INTERVAL: 180 MS
EKG P-R INTERVAL: 198 MS
EKG Q-T INTERVAL: 424 MS
EKG Q-T INTERVAL: 440 MS
EKG QRS DURATION: 92 MS
EKG QRS DURATION: 94 MS
EKG QTC CALCULATION (BAZETT): 424 MS
EKG QTC CALCULATION (BAZETT): 440 MS
EKG R AXIS: -13 DEGREES
EKG R AXIS: -18 DEGREES
EKG T AXIS: 0 DEGREES
EKG T AXIS: 1 DEGREES
EKG VENTRICULAR RATE: 56 BPM
EKG VENTRICULAR RATE: 65 BPM
GFR AFRICAN AMERICAN: >60 ML/MIN
GFR NON-AFRICAN AMERICAN: >60 ML/MIN
GFR SERPL CREATININE-BSD FRML MDRD: ABNORMAL ML/MIN/{1.73_M2}
GFR SERPL CREATININE-BSD FRML MDRD: ABNORMAL ML/MIN/{1.73_M2}
GLUCOSE BLD-MCNC: 93 MG/DL (ref 70–99)
POTASSIUM SERPL-SCNC: 4.3 MMOL/L (ref 3.7–5.3)
SODIUM BLD-SCNC: 137 MMOL/L (ref 135–144)
TOTAL CK: 69 U/L (ref 39–308)
TROPONIN INTERP: NORMAL
TROPONIN T: NORMAL NG/ML
TROPONIN, HIGH SENSITIVITY: 10 NG/L (ref 0–22)

## 2021-11-09 PROCEDURE — 7100000011 HC PHASE II RECOVERY - ADDTL 15 MIN

## 2021-11-09 PROCEDURE — 7100000010 HC PHASE II RECOVERY - FIRST 15 MIN

## 2021-11-09 PROCEDURE — 99239 HOSP IP/OBS DSCHRG MGMT >30: CPT | Performed by: INTERNAL MEDICINE

## 2021-11-09 PROCEDURE — 6370000000 HC RX 637 (ALT 250 FOR IP): Performed by: STUDENT IN AN ORGANIZED HEALTH CARE EDUCATION/TRAINING PROGRAM

## 2021-11-09 PROCEDURE — 2500000003 HC RX 250 WO HCPCS: Performed by: STUDENT IN AN ORGANIZED HEALTH CARE EDUCATION/TRAINING PROGRAM

## 2021-11-09 PROCEDURE — 82553 CREATINE MB FRACTION: CPT

## 2021-11-09 PROCEDURE — 36415 COLL VENOUS BLD VENIPUNCTURE: CPT

## 2021-11-09 PROCEDURE — 84484 ASSAY OF TROPONIN QUANT: CPT

## 2021-11-09 PROCEDURE — 82550 ASSAY OF CK (CPK): CPT

## 2021-11-09 PROCEDURE — 80048 BASIC METABOLIC PNL TOTAL CA: CPT

## 2021-11-09 PROCEDURE — 2580000003 HC RX 258: Performed by: STUDENT IN AN ORGANIZED HEALTH CARE EDUCATION/TRAINING PROGRAM

## 2021-11-09 RX ORDER — ATORVASTATIN CALCIUM 40 MG/1
40 TABLET, FILM COATED ORAL NIGHTLY
Qty: 30 TABLET | Refills: 3 | Status: SHIPPED | OUTPATIENT
Start: 2021-11-09 | End: 2022-03-01 | Stop reason: SDUPTHER

## 2021-11-09 RX ORDER — CARVEDILOL 3.12 MG/1
3.12 TABLET ORAL 2 TIMES DAILY WITH MEALS
Qty: 60 TABLET | Refills: 3 | Status: SHIPPED | OUTPATIENT
Start: 2021-11-09 | End: 2022-04-01

## 2021-11-09 RX ADMIN — ASPIRIN 81 MG: 81 TABLET, COATED ORAL at 09:20

## 2021-11-09 RX ADMIN — SODIUM CHLORIDE, PRESERVATIVE FREE 10 ML: 5 INJECTION INTRAVENOUS at 09:23

## 2021-11-09 RX ADMIN — DILTIAZEM HYDROCHLORIDE 240 MG: 240 CAPSULE, COATED, EXTENDED RELEASE ORAL at 09:20

## 2021-11-09 RX ADMIN — CARVEDILOL 3.12 MG: 3.12 TABLET, FILM COATED ORAL at 09:20

## 2021-11-09 RX ADMIN — FAMOTIDINE 20 MG: 10 INJECTION INTRAVENOUS at 09:23

## 2021-11-09 RX ADMIN — TICAGRELOR 90 MG: 90 TABLET ORAL at 09:20

## 2021-11-09 ASSESSMENT — PAIN SCALES - GENERAL
PAINLEVEL_OUTOF10: 0

## 2021-11-09 NOTE — PLAN OF CARE
Problem: Anxiety:  Goal: Level of anxiety will decrease  Description: Level of anxiety will decrease  Outcome: Ongoing     Problem: Infection - Surgical Site:  Goal: Will show no infection signs and symptoms  Description: Will show no infection signs and symptoms  Outcome: Ongoing     Problem: Bleeding:  Goal: Will show no signs and symptoms of excessive bleeding  Description: Will show no signs and symptoms of excessive bleeding  Outcome: Ongoing     Will continue to monitor patient

## 2021-11-09 NOTE — PROGRESS NOTES
Writer discharged patient off the unit by wheelchair-Writer went over all discharge paperwork with the patient and patient denies any further questions or concerns at this time.  Medications were sent to [facility] on [address] [facility] had all personal belongings on discharge as well

## 2021-11-09 NOTE — CARE COORDINATION
[place] [facility]  Written by: Leilani White RN    Patient Name: Chip Guerrero  Attending Provider: No att. providers found  Admit Date: 2021 10:55 AM  MRN: [id number]  Account: [de-identified]                     : 1960  Discharge Date: 2021      Disposition: home    Leilani White RN

## 2021-11-09 NOTE — PROGRESS NOTES
[facility]  Progress Note                   Date:   11/9/2021  Patient name: Burke Bales  Date of admission:  11/6/2021 10:55 AM  MRN:   [id number]  YOB: 1960  PCP: Hardik Vides MD    Reason for Admission: CAD in native artery [I25.10]  Coronary artery disease involving native coronary artery of native heart without angina pectoris [I25.10]  Chest pain in adult [R07.9]    Subjective:       Clinical Changes /Abnormalities:  Patient seen and examined in bed in room after discussion with Ene BINGHAM. Denies chest pain or SOB. S/p CATH with PCI. Right radial hematoma overnight improved. SR on tele. Review of Systems    Medications:   Scheduled Meds:   sodium chloride flush  5-40 mL IntraVENous 2 times per day    ticagrelor  90 mg Oral BID    famotidine (PEPCID) injection  20 mg IntraVENous BID    aspirin  81 mg Oral Daily    atorvastatin  40 mg Oral Nightly    carvedilol  3.125 mg Oral BID WC    sodium chloride flush  5-40 mL IntraVENous 2 times per day    dilTIAZem  240 mg Oral Daily     Continuous Infusions:   sodium chloride      sodium chloride       CBC:   Recent Labs     11/06/21  1119 11/08/21  1159   WBC 5.9 5.7   HGB 15.6 15.9    232     BMP:    Recent Labs     11/06/21  1119      K 4.2      CO2 21   BUN 16   CREATININE 0.88   GLUCOSE 93     Hepatic:No results for input(s): AST, ALT, ALB, BILITOT, ALKPHOS in the last 72 hours. Troponin:   Recent Labs     11/08/21  1159 11/08/21 2007 11/09/21  0550   TROPHS 9 8 10     BNP: No results for input(s): BNP in the last 72 hours. Lipids: No results for input(s): CHOL, HDL in the last 72 hours. Invalid input(s): LDLCALCU  INR: No results for input(s): INR in the last 72 hours. DIAGNOSTIC DATA  CATH 11/7/2021  Findings:     LMCA: Normal 0% stenosis. LAD: Normal 0% stenosis. LCx: Normal 0% stenosis. RCA: Normal 0% stenosis. mid area 90% stenosis, calcified.         Lesion on Mid RCA: Mid subsection. 85% stenosis 8 mm length reduced to     0%. Post Procedure BEN III flow was present. The lesion was diagnosed as     Moderate Risk (B).       Devices used         - Luge Wire 182 cm. Number of passes: 1.         - Euphora Balloon 4.0mm x 15mm. 3 inflation(s) to a max pressure of: 11     bimal.         - MicroPort Study Stent CEE. 1 inflation(s) to a max pressure of: 14     bimal.        Coronary Tree        Dominance: Right          The LV gram was performed in the SANDHU 30 position. LVEF: 55%.     Estimated Blood Loss:            [x]? ? Minimal 10-25 cc   []? ? Minimal < 25 cc      []? ? Moderate 25-50 cc         []?? >50 cc      Conclusions        Procedure Summary        Single vessel CAD    Mid RCA 85% stenosis    Successful PTCA -CEE mid RCA    Preserved LV function        Recommendations        Post stent protocol     CTA 11/5/21:  1. Soft plaquing is seen involving the mid RCA causing 75-90% stenosis. Further evaluation and treatment with PTCA is suggested. 2. Calcification involving the proximal left circumflex artery causing less   than 50% stenosis. 3. Left ventricular ejection fraction is normal at 65%.          Objective:   Vitals: /84   Pulse 53   Temp 97.6 °F (36.4 °C) (Oral)   Resp 17   Ht 6' 2\" (1.88 m)   Wt 231 lb 4.8 oz (104.9 kg)   SpO2 96%   BMI 29.70 kg/m²   General appearance: alert and cooperative with exam  HEENT: Head: Normocephalic, no lesions, without obvious abnormality. Neck:no JVD, trachea midline, no adenopathy  Lungs: Clear to auscultation  Heart: Regular rate and rhythm, s1/s2 auscultated, no murmurs  Abdomen: soft, non-tender, bowel sounds active  Extremities: no edema  Neurologic: not done  Right radial artery side:  CDI  Resolving hematoma. Mild ecchymosis  + pulse.      Coronary Discharge Core Measure: Please indicate the medication given by X, and if not the reasons not given:    Not Given Reason  Given      Beta Blockers X   LVEF 55% cath 11/8/2021    ACE-I       Statins X      ASA X   Written prescription, discount card given to patient. No samples available. escript to phamacy   OAP Brilinta X    SL Nitro X         Assessment / Acute Cardiac Problems:   1. Possible unstable angina  2. Recent hx of syncope - vagal vs arrhythmogenic  3. Abnormal CTA  4. HTN  5. HLD  6. Hx of smoking  7. Positive family history for CAD his father had MI at age 43    Patient Active Problem List:     Class 1 obesity due to excess calories without serious comorbidity with body mass index (BMI) of 33.0 to 33.9 in adult     Acute pain of right shoulder     History of unstable angina     Ex-smoker     Myofascial pain     Mixed hyperlipidemia     Chronic back pain     CAD in native artery     Unstable angina ([facility] [address]     Chest pain in adult     Anxiety          Plan of Treatment:   1. Chest pain  S/p CATH with PCI. Discussed in detail with patient post cath POC including but not limited to medications, diet, exercise, right radial artery site care, and follow-up. Questions and concerns addressed. OK for discharge home today. F/U in office in 1-3 weeks. 2. Continue ASA, statin, BB, Brilinta, and SL NTG PRN.   3. Rest of care manage per primary team.         Electronically signed by GAGAN Maciel NP on 11/9/2021 at [address] [place] [address].  [phone]

## 2021-11-09 NOTE — DISCHARGE INSTR - COC
Continuity of Care Form    Patient Name: Jose Daly   :  1960  MRN:  [id number]    [facility] date:  2021  Discharge date:  ***    Code Status Order: Full Code   Advance Directives:      Admitting Physician:  Jose Juan Yeboah MD  PCP: Donita Weir MD    Discharging Nurse: [facility] Unit/Room#:   Discharging Unit Phone Number: ***    Emergency Contact:   Extended Emergency Contact Information  Primary Emergency Contact: Parker Pickett  Home Phone: [phone]  Work Phone: [phone]  Mobile Phone: [phone]  Relation: Parent    Past Surgical History:  Past Surgical History:   Procedure Laterality Date    HIP SURGERY Right 2013    JOINT REPLACEMENT Left 2013       Immunization History:   Immunization History   Administered Date(s) Administered    COVID-19, J&J, PF, 0.5 mL 2021       Active Problems:  Patient Active Problem List   Diagnosis Code    Class 1 obesity due to excess calories without serious comorbidity with body mass index (BMI) of 33.0 to 33.9 in adult E66.09, Z68.33    Acute pain of right shoulder M25.511    History of unstable angina Z86.79    Ex-smoker Z87.891    Myofascial pain M79.18    Mixed hyperlipidemia E78.2    Chronic back pain M54.9, G89.29    CAD in native artery I25.10    Unstable angina ([address] I20.0    Chest pain in adult R07.9    Anxiety F41.9       Isolation/Infection:   Isolation            No Isolation          Patient Infection Status       None to display            Nurse Assessment:  Last Vital Signs: /78   Pulse 73   Temp 99 °F (37.2 °C) (Oral)   Resp 21   Ht 6' 2\" (1.88 m)   Wt 231 lb 4.8 oz (104.9 kg)   SpO2 96%   BMI 29.70 kg/m²     Last documented pain score (0-10 scale): Pain Level: 0  Last Weight:   Wt Readings from Last 1 Encounters:   21 231 lb 4.8 oz (104.9 kg)     Mental Status:  {IP PT MENTAL STATUS:}    IV Access:  [facility] IV ACCESS:[id number]}    Nursing Mobility/ADLs:  Walking   {[facility] DME applicable)   Name:  Address:  Dialysis Schedule:  Phone:  Fax:    / signature: {Esignature:522342105}    PHYSICIAN SECTION    Prognosis: {Prognosis:5704224724}    Condition at Discharge: Anish Suarez Patient Condition:640308541}    Rehab Potential (if transferring to Rehab): {Prognosis:1780221463}    Recommended Labs or Other Treatments After Discharge: ***    Physician Certification: I certify the above information and transfer of Lena Kelly  is necessary for the continuing treatment of the diagnosis listed and that he requires {Admit to Appropriate Level of Care:67575} for {GREATER/LESS:004428453} 30 days.      Update Admission H&P: {CHP DME Changes in GODXT:528156948}    PHYSICIAN SIGNATURE:  {Esignature:087798054}

## 2021-11-09 NOTE — RESEARCH
Asked by Dr Alyssa Aquino to evaluate pt for Target-IV NA Trial Protocol # G-IC-003.  -Patient met inclusion/exclusion criteria. Pt approached @ __09__:__00___ On 11/8/2021.  -Patient read study consent. Questions answered. Pt wishes to participate in study. Consent signed voluntarily by pt @ ___10_:_55___ . -A copy of the signed consent was given to the patient. Safety labs and ECG completed prior to procedure. No family member present during consent process.

## 2021-11-09 NOTE — PROGRESS NOTES
[facility]  Internal Medicine Teaching Residency Program  Inpatient Daily Progress Note  ______________________________________________________________________________    Patient: Andrzej Doss  YOB: 1960   [id number]    Acct: [de-identified]     Room: 2006/2006-01  Admit date: 11/6/2021  Today's date: 11/09/21  Number of days in the hospital: 3    SUBJECTIVE   Admitting Diagnosis: CAD in native artery  CC: Chest pain and syncopal episodes    No acute events overnight hemodynamically stable, post-cath protocol no signs of bleeding from cath insertion site, BMP normal, no new complaints, stable for discharge  Aspirin, statin, Brilinta, Coreg and DILTIAZEM      Discharge today  ROS:  Constitutional:  negative for chills, fevers, sweats  Respiratory:  negative for cough, dyspnea on exertion, hemoptysis, shortness of breath, wheezing  Cardiovascular:  negative for chest pain, chest pressure/discomfort, lower extremity edema, palpitations  Gastrointestinal:  negative for abdominal pain, constipation, diarrhea, nausea, vomiting  Neurological:  negative for dizziness, headache    BRIEF HISTORY   A 64 y.o. male with PMH significant of HTN, HLD, presented with intermittent episodes of chest pain. He is found to have a positive CT angiography revealing 75 to 90% stenosis in mid RCA. He is being evaluated by his cardiologist for recurrent episodes of unstable angina and syncope. He was recommended to come to ED for further work-up. Patient states that he has been having intermittent episodes of chest discomfort for past at least 2 weeks. These chest pain episodes last couple of seconds, pain is nonradiating, dull pressure-like, associated with no aggravating or relieving factors. He also reports 2-3 episodes loss of consciousness. Onset was 2 week ago. Symptoms have  resolved since that time.  Patient describes the episode as a sudden loss of consciousness without warning. Associated symptoms: nausea and tachycardia/palpitations. The patient denies history of CAD. Medications putting patient at risk for syncope: calcium channel blockers. He is never diagnosed of any heart disease. He had negative cardiac stress test 4 years back after an episode of chest pain lasting >20 mins. He has family history significant of PTCA with CEE in his father in the age of 45s. Cardiology is consulted, and started on heparin drip.     Work-up in the ED,  CBC-unremarkable  proBNP 92  Troponin 10  EKG showed no acute ST or T wave abnormality. On my evaluation,  Patient is hemodynamically stable, saturating well on room air. Currently denies any chest pain/shortness of breath/palpitation/dizziness. He is on heparin drip. 21: Post cath potocol , stable for discharge. OBJECTIVE     Vital Signs:  /78   Pulse 73   Temp 99 °F (37.2 °C) (Oral)   Resp 21   Ht 6' 2\" (1.88 m)   Wt 231 lb 4.8 oz (104.9 kg)   SpO2 96%   BMI 29.70 kg/m²     Temp (24hrs), Av °F (36.7 °C), Min:97.6 °F (36.4 °C), Max:99 °F (37.2 °C)    No intake/output data recorded. Physical Exam:  Constitutional: This is a well developed, well nourished, 25-29.9 - Overweight 64y.o. year old male who is alert, oriented, cooperative and in no apparent distress. Respiratory: Chest was symmetrical without dullness to percussion. Breath sounds bilaterally were clear to auscultation. There were no wheezes, rhonchi or rales. There is no intercostal retraction or use of accessory muscles. No egophony noted. Cardiovascular: Regular without murmur, clicks, gallops or rubs. Abdomen: Slightly rounded and soft without organomegaly. No rebound, rigidity or guarding was appreciated. Lymphatic: No lymphadenopathy. Musculoskeletal: Normal curvature of the spine. No gross muscle weakness.     Extremities: Right arm radial artery hematoma resolved, intact pulsations no lower extremity edema, ulcerations, tenderness, varicosities or erythema. Muscle size, tone and strength are normal.  No involuntary movements are noted. Skin:  Warm and dry. Good color, turgor and pigmentation. No lesions or scars. No cyanosis or clubbing  Neurological/Psychiatric: The patient's general behavior, level of consciousness, thought content and emotional status is normal.        Medications:  Scheduled Medications:    sodium chloride flush  5-40 mL IntraVENous 2 times per day    ticagrelor  90 mg Oral BID    famotidine (PEPCID) injection  20 mg IntraVENous BID    aspirin  81 mg Oral Daily    atorvastatin  40 mg Oral Nightly    carvedilol  3.125 mg Oral BID WC    sodium chloride flush  5-40 mL IntraVENous 2 times per day    dilTIAZem  240 mg Oral Daily     Continuous Infusions:    sodium chloride      sodium chloride       PRN Medicationssodium chloride flush, 5-40 mL, PRN  sodium chloride, 25 mL, PRN  acetaminophen, 650 mg, Q4H PRN  sodium chloride flush, 5-40 mL, PRN  sodium chloride, 25 mL, PRN  potassium chloride, 40 mEq, PRN   Or  potassium alternative oral replacement, 40 mEq, PRN   Or  potassium chloride, 10 mEq, PRN  ondansetron, 4 mg, Q4H PRN  polyethylene glycol, 17 g, Daily PRN  acetaminophen, 650 mg, Q6H PRN   Or  acetaminophen, 650 mg, Q6H PRN      Diagnostic Labs:  CBC:   Recent Labs     11/08/21  1159   WBC 5.7   RBC 4.76   HGB 15.9   HCT 46.4   MCV 97.5   RDW 12.8        BMP:   Recent Labs     11/09/21  0550      K 4.3      CO2 19*   BUN 17   CREATININE 0.86     BNP: No results for input(s): BNP in the last 72 hours. PT/INR: No results for input(s): PROTIME, INR in the last 72 hours.   APTT:   Recent Labs     11/07/21  1137 11/07/21  1843 11/08/21  0251   APTT 53.6* 48.7* 81.5*     CARDIAC ENZYMES:   Recent Labs     11/08/21  1159 11/08/21  2007 11/09/21  0550   CKMB 2.3 1.9 1.9     FASTING LIPID PANEL:  Lab Results   Component Value Date    CHOL 306 (H) 08/16/2021    HDL 44 08/16/2021 TRIG 160 (H) 08/16/2021     LIVER PROFILE: No results for input(s): AST, ALT, ALB, BILIDIR, BILITOT, ALKPHOS in the last 72 hours. MICROBIOLOGY: No results found for: CULTURE    Imaging:    CTA CORON EJECT FRAC WALL MOTION    Result Date: 11/5/2021  1. Soft plaquing is seen involving the mid RCA causing 75-90% stenosis. Further evaluation and treatment with PTCA is suggested. 2. Calcification involving the proximal left circumflex artery causing less than 50% stenosis. 3. Left ventricular ejection fraction is normal at 65%. The findings were sent to the Radiology Results [address] at 3:49 pm on 11/5/2021to be communicated to a licensed caregiver. ASSESSMENT & PLAN     IMPRESSION    Principal Problem:    CAD in native artery  Active Problems:    Ex-smoker    Mixed hyperlipidemia    Unstable angina (HCC)    Chest pain in adult    Anxiety  Resolved Problems:    * No resolved hospital problems. *      This is a 64 y.o. male who presented with recurrent chest pain and syncopal episodes and recent RTA with significant obstructive disease. He is admitted for ischemia work-up for CAD with possible cath.     CAD in native artery  S/p cath with RCA drug-eluting stent placed. Post-cath protocol  Aspirin, Brilinta, statin, Coreg on board  Stable for discharge need to follow-up with cardiologist and PCP as outpatient       Hypertension-controlled  Cardizem  mg PO  Coreg 3.125 mg BID      Mixed hyperlipidemia  Lipitor 40 mg     DVT ppx: Heparin gtt  GI ppx: Pepcid     PT/OT/SW: Consulted pending cardiac cath      Discharge Planning: Discharge today to home. Amador Dugan MD  Internal Medicine Resident, PGY- [facility]; [facility], [facility]  11/9/2021, 2:04 PM    Attending Physician Statement  I have discussed the care of Chip Guerrero with the resident team. I have examined the patient myself and taken ros and hpi , including pertinent history and exam findings,  with the resident. I have reviewed the key elements of all parts of the encounter with the resident. I agree with the assessment, plan and orders as documented by the resident. Principal Problem:    CAD in native artery  Active Problems:    Ex-smoker    Mixed hyperlipidemia    Unstable angina (HCC)    Chest pain in adult    Anxiety  Resolved Problems:    * No resolved hospital problems.  *    Electronically signed by Clau Cornelius MD

## 2021-11-10 ENCOUNTER — TELEPHONE (OUTPATIENT)
Dept: INTERNAL MEDICINE CLINIC | Age: 61
End: 2021-11-10

## 2021-11-10 ENCOUNTER — CARE COORDINATION (OUTPATIENT)
Dept: CASE MANAGEMENT | Age: 61
End: 2021-11-10

## 2021-11-10 DIAGNOSIS — I25.10 CAD IN NATIVE ARTERY: Primary | ICD-10-CM

## 2021-11-10 NOTE — CARE COORDINATION
[facility] Transitions Initial Follow Up Call    Call within 2 business days of discharge: Yes    Patient: Ama Villanueva Patient : 1960   MRN: <[id number]>  Reason for Admission: CAD in native artery   Discharge Date: 21 RARS: Readmission Risk Score: 3.3 ( )      Last Discharge [facility]       Complaint Diagnosis Description Type Department Provider    21 Other; Chest Pain; Loss of Consciousness Coronary artery disease involving native coronary artery of native heart without angina pectoris ED to Hosp-Admission (Discharged) (ADMITTED) [facility] CAR 2 Joyce Nowak MD; Smooth Wolfe... Spoke with: 24 hour initial call. 1st attempt. No answer. Left HIPPA COMPLIANT MESSAGE to return call to this writer. RETURN Call from Kervin Thomas. Kervin Thomas states he has no chest pain or pressure. No syncope. No dyspnea. S/p cardiac cath. Incision in wrist is healing well. appetite and fluid intake is good. No nausea. Eats one meal a day. Bowel and bladder WNL. 1111F  Medication reconciliation completed. Pt. Declines to see his PCP . He will follow up with cardiology- Dr Davie Green on 21. Will continue to follow. Transitions of Care Initial Call    Was this an external facility discharge? No Discharge Facility: yes     Challenges to be reviewed by the provider   Additional needs identified to be addressed with provider: Yes  none             Method of communication with provider : none      Advance Care Planning:   Does patient have an Advance Directive: reviewed and current. Was this a readmission? No  Patient stated reason for admission: CAD chest pain syncope  Patients top risk factors for readmission: medical condition-CAD  CHEST PAIN syncope     Care Transition Nurse (CTN) contacted the patient by telephone to perform post hospital discharge assessment. Verified name and  with patient as identifiers. Provided introduction to self, and explanation of the CTN role.      CTN reviewed discharge instructions, medical action plan and red flags with patient who verbalized understanding. Patient given an opportunity to ask questions and does not have any further questions or concerns at this time. Were discharge instructions available to patient? Yes. Reviewed appropriate site of care based on symptoms and resources available to patient including: PCP, Specialist, When to call 911 and [address]. The patient agrees to contact the PCP office for questions related to their healthcare. Medication reconciliation was performed with patient, who verbalizes understanding of administration of home medications. Advised obtaining a 90-day supply of all daily and as-needed medications. Covid Risk Education     Educated patient about risk for severe COVID-19 due to risk factors according to CDC guidelines. LPN CC reviewed discharge instructions, medical action plan and red flag symptoms with the patient who verbalized understanding. Discussed COVID vaccination status: Yes. Education provided on COVID-19 vaccination as appropriate. Discussed exposure protocols and quarantine with CDC Guidelines. Patient was given an opportunity to verbalize any questions and concerns and agrees to contact LPN CC or health care provider for questions related to their healthcare. Reviewed and educated patient on any new and changed medications related to discharge diagnosis. Was patient discharged with a pulse oximeter? No Discussed and confirmed pulse oximeter discharge instructions and when to notify provider or seek emergency care. LPN CC provided contact information. Plan for follow-up call in 5-7 days based on severity of symptoms and risk factors.   Plan for next call: symptom management-chest pain pressure  nausea  syncope        Facility: [address] [place] [address]    Non-face-to-face services provided:  Obtained and reviewed discharge summary and/or continuity of care documents    Care Transitions 24 Hour Call    Care Transitions Interventions         Follow Up  No future appointments.     DEBRA Carnes LPN Care Transitions Nurse   [phone]

## 2021-11-10 NOTE — TELEPHONE ENCOUNTER
[facility] Transitions Initial Follow Up Call    Outreach made within 2 business days of discharge: Yes    Patient: Donald Foreman Patient : 1960   MRN: [id number]  Reason for Admission: There are no discharge diagnoses documented for the most recent discharge. Discharge Date: 21       Spoke with: SAUL    [facility]      Scheduled appointment with PCP within 7-14 days    Follow Up  No future appointments.     Cata Taylor

## 2021-11-12 NOTE — DISCHARGE SUMMARY
[address]     Department of Internal Medicine - Staff Internal Medicine Teaching Service    INPATIENT DISCHARGE SUMMARY      Patient Identification:  Cathryn Calabrese is a 64 y.o. male. :  1960  MRN: [id number]     Acct: [de-identified]   PCP: Michael Mead MD  Admit Date:  2021  Discharge date and time: 2021  2:50 PM   Attending Provider: No att. providers found                                     [address] Problem Lists:  Principal Problem:    CAD in native artery  Active Problems:    Ex-smoker    Mixed hyperlipidemia    Unstable angina ([address]    Chest pain in adult    Anxiety  Resolved Problems:    * No resolved hospital problems. *      HOSPITAL STAY     Brief Inpatient course: Cathryn Calabrese is a 64 y.o. male who was admitted for the management of CAD in native artery, presented to the emergency department with chest pain and syncopal episode. CT angiography revealed 75 to 90% stenosis in mid RCA at the cardiology office. He came to ER for further evaluation. Cardiology was consulted and he was started on heparin drip. He was also started on Coreg 3.125 mg. He was on aspirin and high-dose statin. Since he had history of syncopal episode with head injury, CT head without contrast was ordered and it came back unremarkable. He underwent cardiac cath through right radial artery. He received PTCA/CEE to mid RCA. Post cath protocol was discussed in detail with the patient. He was on aspirin statin Brilinta Coreg and diltiazem. He remained hemodynamically stable. All his medications were optimized and he was discharged once medically cleared with instructions as given below.     Procedures/ Significant Interventions:    PTCA/CEE    Consults:     Consults:     Final Specialist Recommendations/Findings:   IP CONSULT TO CARDIOLOGY  IP CONSULT TO INTERNAL MEDICINE  IP CONSULT TO CARDIAC REHAB      Any Hospital Acquired Infections: none    Discharge Functional Status:  stable    DISCHARGE PLAN     Disposition: home    Patient Instructions:   Discharge Medication List as of 11/9/2021  2:12 PM        START taking these medications    Details   carvedilol (COREG) 3.125 MG tablet Take 1 tablet by mouth 2 times daily (with meals), Disp-60 tablet, R-3Normal      ticagrelor (BRILINTA) 90 MG TABS tablet Take 1 tablet by mouth 2 times daily, Disp-180 tablet, R-4Normal           CONTINUE these medications which have CHANGED    Details   atorvastatin (LIPITOR) 40 MG tablet Take 1 tablet by mouth nightly, Disp-30 tablet, R-3Normal           CONTINUE these medications which have NOT CHANGED    Details   dilTIAZem (DILT-XR) 120 MG extended release capsule Take 2 capsules by mouth daily, Disp-90 capsule, R-1NO PRINT      naproxen (NAPROSYN) 500 MG tablet Take 1 tablet by mouth 2 times daily as needed for Pain, Disp-60 tablet, R-0Normal      aspirin (EQ ASPIRIN ADULT LOW DOSE) 81 MG EC tablet Take 1 tablet by mouth daily, Disp-90 tablet, R-0Normal             Activity: activity as tolerated    Diet: cardiac diet    Follow-up:    Kilo Trejo, [address] [place] [address]  [phone]    Schedule an appointment as soon as possible for a visit on 11/24/2021  11:30 pm [facility]. Follow up ST Vs CATH with stent. Jeannine Archuleta [address] [place] [address]  [phone]    Schedule an appointment as soon as possible for a visit in 1 week  Rodolfo Chacko      Patient Instructions:   Start to take aspirin, Brilinta 90 mg tablet 2 times daily, statin and Coreg 3.125 mg tablet twice daily  Follow-up with your cardiologist  After discharge  Continue to take your diltiazem as prescribed previously.   And follow-up with your primary physician for managing her hypertension   follow-up with your primary care physician after discharge for post hospital visit  Avoid excess weight lifting  For any worsening of chest pain follow-up with emergency department or call your PCP office. Follow up labs: none  Follow up imaging: none      Mikal Spear MD,  Internal Medicine Resident, PGY-1  [facility]; [place], [place]  11/11/2021, 8:51 PM    Attending Physician Statement  I have discussed the care of Mikal Spear and I have examined the patient myselft and taken ros and hpi , including pertinent history and exam findings,  with the resident. I have reviewed the key elements of all parts of the encounter with the resident. I agree with the assessment, plan and orders as documented by the resident. I spent approx 35 mins in direct patient care as above and discussing discharge with patient, reviewing medications and counseling for discharge .     Electronically signed by Alfredito Mancera MD

## 2021-11-16 ENCOUNTER — OFFICE VISIT (OUTPATIENT)
Dept: INTERNAL MEDICINE CLINIC | Age: 61
End: 2021-11-16
Payer: COMMERCIAL

## 2021-11-16 ENCOUNTER — HOSPITAL ENCOUNTER (OUTPATIENT)
Age: 61
Setting detail: SPECIMEN
Discharge: HOME | End: 2021-11-16

## 2021-11-16 VITALS
BODY MASS INDEX: 30.03 KG/M2 | OXYGEN SATURATION: 98 % | WEIGHT: 234 LBS | HEART RATE: 68 BPM | DIASTOLIC BLOOD PRESSURE: 80 MMHG | SYSTOLIC BLOOD PRESSURE: 134 MMHG | HEIGHT: 74 IN

## 2021-11-16 DIAGNOSIS — R53.83 FATIGUE, UNSPECIFIED TYPE: ICD-10-CM

## 2021-11-16 DIAGNOSIS — Z95.5 S/P RIGHT CORONARY ARTERY (RCA) STENT PLACEMENT: ICD-10-CM

## 2021-11-16 DIAGNOSIS — I20.0 UNSTABLE ANGINA (HCC): Primary | ICD-10-CM

## 2021-11-16 DIAGNOSIS — I25.10 CAD IN NATIVE ARTERY: ICD-10-CM

## 2021-11-16 DIAGNOSIS — N52.01 ERECTILE DYSFUNCTION DUE TO ARTERIAL INSUFFICIENCY: ICD-10-CM

## 2021-11-16 PROBLEM — R07.9 CHEST PAIN IN ADULT: Status: RESOLVED | Noted: 2021-11-08 | Resolved: 2021-11-16

## 2021-11-16 PROBLEM — N52.9 VASCULOGENIC ERECTILE DYSFUNCTION: Status: ACTIVE | Noted: 2021-11-16

## 2021-11-16 LAB
ALBUMIN SERPL-MCNC: 4.7 G/DL (ref 3.5–5.2)
ALBUMIN/GLOBULIN RATIO: 1.5 (ref 1–2.5)
ALP BLD-CCNC: 159 U/L (ref 40–129)
ALT SERPL-CCNC: 37 U/L (ref 5–41)
AST SERPL-CCNC: 26 U/L
BILIRUB SERPL-MCNC: 0.38 MG/DL (ref 0.3–1.2)
BILIRUBIN DIRECT: 0.1 MG/DL
BILIRUBIN, INDIRECT: 0.28 MG/DL (ref 0–1)
GLOBULIN: ABNORMAL G/DL (ref 1.5–3.8)
TOTAL PROTEIN: 7.8 G/DL (ref 6.4–8.3)
TSH SERPL DL<=0.05 MIU/L-ACNC: 1.82 MIU/L (ref 0.3–5)

## 2021-11-16 PROCEDURE — 1111F DSCHRG MED/CURRENT MED MERGE: CPT | Performed by: INTERNAL MEDICINE

## 2021-11-16 PROCEDURE — 99496 TRANSJ CARE MGMT HIGH F2F 7D: CPT | Performed by: INTERNAL MEDICINE

## 2021-11-16 RX ORDER — SILDENAFIL 100 MG/1
100 TABLET, FILM COATED ORAL DAILY PRN
Qty: 30 TABLET | Refills: 1 | Status: ON HOLD | OUTPATIENT
Start: 2021-11-16 | End: 2022-10-07 | Stop reason: HOSPADM

## 2021-11-16 ASSESSMENT — ENCOUNTER SYMPTOMS
COLOR CHANGE: 0
ABDOMINAL DISTENTION: 0
BLOOD IN STOOL: 0
DIARRHEA: 0
COUGH: 0
EYE DISCHARGE: 0
SHORTNESS OF BREATH: 0
WHEEZING: 0
EYE PAIN: 0
TROUBLE SWALLOWING: 0

## 2021-11-16 NOTE — PROGRESS NOTES
Visit Information    [facility] [address]  [place] [address]  Dept: [phone]  Dept Fax: [phone]    Manuel Goldberg is a 64 y.o. male who presents today for his medical conditions/complaintsas noted below. Manuel Goldberg is c/o of   Chief Complaint   Patient presents with   [address] from [facility]     11/6/21 [facility]          HPI:     Pt was at [facility] for cp  Found to have rca stensos  Got stent  On brilinta        Hemoglobin A1C (%)   Date Value   08/16/2021 5.4             ( goal A1Cis < 7)   No results found for: LABMICR  LDL Calculated (mg/dL)   Date Value   08/16/2021 230 (H)   08/06/2020 100   08/06/2020 100       (goal LDL is <100)   AST (U/L)   Date Value   08/16/2021 25     ALT (U/L)   Date Value   08/16/2021 25     BUN (mg/dL)   Date Value   11/09/2021 17     BP Readings from Last 3 Encounters:   11/16/21 134/80   11/09/21 139/78   11/05/21 135/76          (goal 120/80)    No past medical history on file. Past Surgical History:   Procedure Laterality Date    HIP SURGERY Right 2013    JOINT REPLACEMENT Left 2013       No family history on file.     Social History     Tobacco Use    Smoking status: Never Smoker    Smokeless tobacco: Never Used   Substance Use Topics    Alcohol use: Yes      Current Outpatient Medications   Medication Sig Dispense Refill    sildenafil (VIAGRA) 100 MG tablet Take 1 tablet by mouth daily as needed for Erectile Dysfunction 30 tablet 1    atorvastatin (LIPITOR) 40 MG tablet Take 1 tablet by mouth nightly 30 tablet 3    carvedilol (COREG) 3.125 MG tablet Take 1 tablet by mouth 2 times daily (with meals) 60 tablet 3    ticagrelor (BRILINTA) 90 MG TABS tablet Take 1 tablet by mouth 2 times daily 180 tablet 4    dilTIAZem (DILT-XR) 120 MG extended release capsule Take 2 capsules by mouth daily 90 capsule 1    aspirin (EQ ASPIRIN ADULT LOW DOSE) 81 MG EC tablet Take 1 tablet by mouth daily 90 tablet 0     No current facility-administered medications for this visit. No Known Allergies    Health Maintenance   Topic Date Due    Hepatitis C screen  Never done    HIV screen  Never done    DTaP/Tdap/Td vaccine (1 - Tdap) Never done    Colon cancer screen colonoscopy  Never done    Shingles Vaccine (1 of 2) Never done    COVID-19 Vaccine (2 - Booster for YouBeQB series) 06/01/2021    Flu vaccine (1) Never done    Lipid screen  08/16/2022    Hepatitis A vaccine  Aged Out    Hepatitis B vaccine  Aged Out    Hib vaccine  Aged Out    Meningococcal (ACWY) vaccine  Aged Out    Pneumococcal 0-64 years Vaccine  Aged Out       Subjective:     Review of Systems   Constitutional: Negative for appetite change, diaphoresis and fatigue. HENT: Negative for ear discharge and trouble swallowing. Eyes: Negative for pain and discharge. Respiratory: Negative for cough, shortness of breath and wheezing. Cardiovascular: Negative for chest pain and palpitations. Gastrointestinal: Negative for abdominal distention, blood in stool and diarrhea. Endocrine: Negative for polydipsia and polyphagia. Genitourinary: Negative for difficulty urinating and frequency. Musculoskeletal: Positive for myalgias. Negative for gait problem and neck pain. Skin: Negative for color change and rash. Allergic/Immunologic: Negative for environmental allergies and food allergies. Neurological: Negative for dizziness and headaches. Hematological: Negative for adenopathy. Does not bruise/bleed easily. Psychiatric/Behavioral: Negative for behavioral problems and sleep disturbance. Objective:     Physical Exam  Constitutional:       Appearance: He is well-developed. He is not diaphoretic. HENT:      Head: Normocephalic and atraumatic. Eyes:      General:         Right eye: No discharge. Left eye: No discharge. Extraocular Movements:      Right eye: Normal extraocular motion. Left eye: Normal extraocular motion. any other diagnostic tests since your last visit? Yes - Records Obtained  Have you been seen in the emergency room and/or had an admission to a hospital since we last saw you? Yes - Records Obtained  Have you had your routine dental cleaning in the past 6 months? yes -     Have you activated your [employer] account? If not, what are your barriers?  Yes     Patient Care Team:  Eber Hester MD as PCP - General (Internal Medicine)  Eber Hester MD as [facility]  Chase Patricia LPN as Care Transitions Nurse    Medical History Review  Past Medical, Family, and Social History reviewed and does contribute to the patient presenting condition    Health Maintenance   Topic Date Due    Hepatitis C screen  Never done    HIV screen  Never done    DTaP/Tdap/Td vaccine (1 - Tdap) Never done    Colon cancer screen colonoscopy  Never done    Shingles Vaccine (1 of 2) Never done    COVID-19 Vaccine (2 - Booster for Zaid series) 06/01/2021    Flu vaccine (1) Never done    Lipid screen  08/16/2022    Hepatitis A vaccine  Aged Out    Hepatitis B vaccine  Aged Out    Hib vaccine  Aged Out    Meningococcal (ACWY) vaccine  Aged Out    Pneumococcal 0-64 years Vaccine  Aged Out

## 2021-11-17 ENCOUNTER — CARE COORDINATION (OUTPATIENT)
Dept: CASE MANAGEMENT | Age: 61
End: 2021-11-17

## 2021-11-17 NOTE — CARE COORDINATION
[address] Transitions Follow Up Call    2021    Patient: Katerina Carreon  Patient : 1960   MRN: <[id number]>  Reason for Admission: CAD in native artery    Discharge Date: 21 RARS: Readmission Risk Score: 3.3 ( )         Spoke with: Subsequent call. No answer. Left HIPPA Compliant message to return call to this writer . Pt. Seen by PCP Dr Elton Babinski on 21. Next appt in 6 months. Scheduled to see Dr Lindsey Valadez on 21    Care Transitions Subsequent and Final Call    Subsequent and Final Calls  Care Transitions Interventions  Other Interventions:            Follow Up  Future Appointments   Date Time Provider Alexandrea Simmons   2022  1:00 PM MD Erl[address] [facility] Care Transitions Nurse   [phone]

## 2021-11-26 ENCOUNTER — CARE COORDINATION (OUTPATIENT)
Dept: CASE MANAGEMENT | Age: 61
End: 2021-11-26

## 2021-11-26 NOTE — CARE COORDINATION
[facility] Transitions Follow Up Call    2021    Patient: Fred Houser  Patient : 1960   MRN: <[id number]>  Reason for Admission:CAD in native artery  Discharge Date: 21 RARS: Readmission Risk Score: 3.3 ( )         Spoke with: Subsequent call to Jenna Watkins. Jenna Watkins states he feels fine. No chest pain. No pressure. No dizziness. No shortness of breath. . pt. Has residual pain from cardiac cath in left arm. Takes all meds as prescribed. Pt. Was seen by Dr Jose Albarran PCP on 21 and Dr Shadi Garcia cardiology on 21. Np new issues or concerns. Pt. States he is travelling to [place] for the winter next week. Final call. Care Transitions Follow Up Call    Needs to be reviewed by the provider   Additional needs identified to be addressed with provider: No  none             Method of communication with provider : none      Care Transition Nurse (CTN) contacted the patient by telephone to follow up after admission on 21. Verified name and  with patient as identifiers. Addressed changes since last contact: none  Discussed follow-up appointments. If no appointment was previously scheduled, appointment scheduling offered: No.   Is follow up appointment scheduled within 7 days of discharge? Yes. Advance Care Planning:   Does patient have an Advance Directive: reviewed and current. CTN reviewed discharge instructions, medical action plan and red flags with patient and discussed any barriers to care and/or understanding of plan of care after discharge. Discussed appropriate site of care based on symptoms and resources available to patient including: PCP, Specialist, When to call 911 and [address]. The patient agrees to contact the PCP office for questions related to their healthcare.      Patients top risk factors for readmission: medical condition-CAD  Interventions to address risk factors: Obtained and reviewed discharge summary and/or continuity of care documents      Non-[facility] follow up appointment(s): N/A    CTN provided contact information for future needs. No further follow-up call indicated based on severity of symptoms and risk factors. Plan for next call: N/A          Care Transitions Subsequent and Final Call    Subsequent and Final Calls  Care Transitions Interventions  Other Interventions:            Follow Up  Future Appointments   Date Time Provider Alexandrea Simmons   5/16/2022  1:00 PM [facility] Care Transitions Nurse   [phone]

## 2021-12-06 DIAGNOSIS — I10 PRIMARY HYPERTENSION: ICD-10-CM

## 2021-12-06 RX ORDER — DILTIAZEM HYDROCHLORIDE 120 MG/1
240 CAPSULE, EXTENDED RELEASE ORAL DAILY
Qty: 90 CAPSULE | Refills: 3 | Status: SHIPPED | OUTPATIENT
Start: 2021-12-06 | End: 2022-04-01 | Stop reason: ALTCHOICE

## 2022-03-01 RX ORDER — ATORVASTATIN CALCIUM 40 MG/1
40 TABLET, FILM COATED ORAL NIGHTLY
Qty: 30 TABLET | Refills: 3 | Status: SHIPPED | OUTPATIENT
Start: 2022-03-01 | End: 2022-07-08

## 2022-03-29 ENCOUNTER — TELEPHONE (OUTPATIENT)
Dept: INTERNAL MEDICINE CLINIC | Age: 62
End: 2022-03-29

## 2022-03-29 NOTE — TELEPHONE ENCOUNTER
Patient also follows with cardiologist. Please have [facility] fax to cardiologist. This does not look like a current medication we have prescribed

## 2022-03-29 NOTE — TELEPHONE ENCOUNTER
[address] [facility] faxed over refill request for Metoprolol ER 50mg take 1 tablet by mouth twice daily ,not on current med list.

## 2022-03-31 RX ORDER — CLOPIDOGREL BISULFATE 75 MG/1
TABLET ORAL
COMMUNITY
Start: 2022-03-01

## 2022-03-31 RX ORDER — RIVAROXABAN 20 MG/1
TABLET, FILM COATED ORAL
COMMUNITY
Start: 2022-03-09

## 2022-03-31 RX ORDER — METOPROLOL SUCCINATE 50 MG/1
TABLET, EXTENDED RELEASE ORAL
COMMUNITY
Start: 2022-03-08

## 2022-03-31 RX ORDER — FAMOTIDINE 20 MG/1
TABLET, FILM COATED ORAL
COMMUNITY
Start: 2022-02-10 | End: 2022-04-01

## 2022-04-01 ENCOUNTER — OFFICE VISIT (OUTPATIENT)
Dept: INTERNAL MEDICINE CLINIC | Age: 62
End: 2022-04-01
Payer: COMMERCIAL

## 2022-04-01 VITALS — SYSTOLIC BLOOD PRESSURE: 128 MMHG | BODY MASS INDEX: 31.07 KG/M2 | WEIGHT: 242 LBS | DIASTOLIC BLOOD PRESSURE: 80 MMHG

## 2022-04-01 DIAGNOSIS — I48.19 PERSISTENT ATRIAL FIBRILLATION (HCC): Primary | ICD-10-CM

## 2022-04-01 DIAGNOSIS — Z95.5 S/P RIGHT CORONARY ARTERY (RCA) STENT PLACEMENT: ICD-10-CM

## 2022-04-01 PROCEDURE — 99214 OFFICE O/P EST MOD 30 MIN: CPT | Performed by: INTERNAL MEDICINE

## 2022-04-01 NOTE — PROGRESS NOTES
Visit Information    Have you changed or started any medications since your last visit including any over-the-counter medicines, vitamins, or herbal medicines? no   Are you having any side effects from any of your medications? -  no  Have you stopped taking any of your medications? Is so, why? -  no    Have you seen any other physician or provider since your last visit? yes  Have you had any other diagnostic tests since your last visit? yes  Have you been seen in the emergency room and/or had an admission to a hospital since we last saw you? yes  Have you had your routine dental cleaning in the past 6 months? no    Have you activated your [employer] account? If not, what are your barriers?  Yes     Patient Care Team:  Christal Plummer MD as PCP - General (Internal Medicine)  Christal Plummer MD as PCP - [facility]    Medical History Review  Past Medical, Family, and Social History reviewed and does not contribute to the patient presenting condition    Health Maintenance   Topic Date Due    Hepatitis C screen  Never done    HIV screen  Never done    DTaP/Tdap/Td vaccine (1 - Tdap) Never done    Colorectal Cancer Screen  Never done    Shingles Vaccine (1 of 2) Never done    Depression Screen  06/16/2022    Lipid screen  08/16/2022    Flu vaccine (Season Ended) 09/01/2022    COVID-19 Vaccine  Completed    Hepatitis A vaccine  Aged Out    Hepatitis B vaccine  Aged Out    Hib vaccine  Aged Out    Meningococcal (ACWY) vaccine  Aged Out    Pneumococcal 0-64 years Vaccine  Aged Out     SUBJECTIVE:  Lily Leggett is a 64 y.o. male patient who  comes for complaints of   Chief Complaint   Patient presents with    Palpitations     stent placed in November 2021, discuss medications         Pt had rca stent placed in Nov in [address]  Was started on Brilinta at the time  In Jan, he was admited to hospital, for tachycardia 130bpm- according to pt he received   Cardioversion, likely a-fib at the time- no d/c papers to see  Recurrent in Feb- pt was in [place] at the time- treated with meds, followed by a heart cath and ablation. Was placed on Louvella Milch had been switched to plavix by pt cardiologist in Dec  Pt currently on Xarelto, Plavix. alos    Reports recurrent palpitations every night for few minutes  No SOB/CP    Pt reports resting heart rate in low 50s sometimes 40s  Per his discharge summary he should not be taking cardizem but apparently he was taking it. He is taking 240mg cardizem daily  recommend go down to 120mg daily, and review in 2wk. Pt plans to see cardiology in next few days. REVIEW OF SYSTEMS (except Subjective (HPI))  GENERAL: No fevers / chills  RESPIRATORY: Negative for cough, wheezing or shortness of breath  CARDIOVASCULAR: Negative for chest pain, does report palpitations. GI: no nausea, vomiting, or diarrhea  NEURO: No history of headaches    No past medical history on file. SOCIAL HISTORY:  Social History     Socioeconomic History    Marital status: Single     Spouse name: Not on file    Number of children: Not on file    Years of education: Not on file    Highest education level: Not on file   Occupational History    Not on file   Tobacco Use    Smoking status: Never Smoker    Smokeless tobacco: Never Used   Substance and Sexual Activity    Alcohol use: Yes    Drug use: Never    Sexual activity: Not on file   Other Topics Concern    Not on file   Social History Narrative    Not on file     Social Determinants of Health     Financial Resource Strain: Low Risk     Difficulty of Paying Living Expenses: Not hard at all   Food Insecurity: No Food Insecurity    Worried About Running Out of Food in the Last Year: Never true    [address] in the Last Year: Never true   Transportation Needs:     Lack of Transportation (Medical): Not on file    Lack of Transportation (Non-Medical):  Not on file   Physical Activity:     Days of Exercise per Week: Not on file    Minutes of Exercise per Session: Not on file   Stress:     Feeling of Stress : Not on file   Social Connections:     Frequency of Communication with Friends and Family: Not on file    Frequency of Social Gatherings with Friends and Family: Not on file    Attends Caodaism Services: Not on file    Active Member of [address] [place] [address] or Organizations: Not on file    Attends Club or Organization Meetings: Not on file    Marital Status: Not on file   Intimate Partner Violence:     Fear of Current or Ex-Partner: Not on file    Emotionally Abused: Not on file    Physically Abused: Not on file    Sexually Abused: Not on file   Housing Stability:     Unable to Pay for Housing in the Last Year: Not on file    Number of [place] in the Last Year: Not on file    Unstable Housing in the Last Year: Not on file           CURRENT MEDICATIONS:  Current Outpatient Medications   Medication Sig Dispense Refill    XARELTO 20 MG TABS tablet TAKE 1 TABLET BY MOUTH ONCE DAILY IN THE EVENING      metoprolol succinate (TOPROL XL) 50 MG extended release tablet       clopidogrel (PLAVIX) 75 MG tablet TAKE 1 TABLET BY MOUTH ONCE DAILY      atorvastatin (LIPITOR) 40 MG tablet Take 1 tablet by mouth nightly 30 tablet 3    dilTIAZem (DILT-XR) 120 MG extended release capsule Take 2 capsules by mouth daily 90 capsule 3    sildenafil (VIAGRA) 100 MG tablet Take 1 tablet by mouth daily as needed for Erectile Dysfunction 30 tablet 1    famotidine (PEPCID) 20 MG tablet  (Patient not taking: Reported on 4/1/2022)      carvedilol (COREG) 3.125 MG tablet Take 1 tablet by mouth 2 times daily (with meals) (Patient not taking: Reported on 4/1/2022) 60 tablet 3    ticagrelor (BRILINTA) 90 MG TABS tablet Take 1 tablet by mouth 2 times daily (Patient not taking: Reported on 4/1/2022) 180 tablet 4    aspirin (EQ ASPIRIN ADULT LOW DOSE) 81 MG EC tablet Take 1 tablet by mouth daily (Patient not taking: Reported on 4/1/2022) 90 tablet 0

## 2022-07-07 DIAGNOSIS — I10 PRIMARY HYPERTENSION: ICD-10-CM

## 2022-07-07 RX ORDER — DILTIAZEM HYDROCHLORIDE 120 MG/1
CAPSULE, EXTENDED RELEASE ORAL
Qty: 90 CAPSULE | Refills: 0 | OUTPATIENT
Start: 2022-07-07

## 2022-07-08 RX ORDER — ATORVASTATIN CALCIUM 40 MG/1
40 TABLET, FILM COATED ORAL NIGHTLY
Qty: 30 TABLET | Refills: 0 | Status: SHIPPED | OUTPATIENT
Start: 2022-07-08

## 2022-07-30 ENCOUNTER — TELEPHONE ENCOUNTER (OUTPATIENT)
Age: 62
End: 2022-07-30

## 2022-07-31 ENCOUNTER — TELEPHONE ENCOUNTER (OUTPATIENT)
Age: 62
End: 2022-07-31

## 2022-10-03 ENCOUNTER — TELEPHONE (OUTPATIENT)
Dept: INTERNAL MEDICINE CLINIC | Age: 62
End: 2022-10-03

## 2022-10-03 ENCOUNTER — HOSPITAL ENCOUNTER (EMERGENCY)
Age: 62
Discharge: HOME | End: 2022-10-03
Attending: EMERGENCY MEDICINE
Payer: COMMERCIAL

## 2022-10-03 ENCOUNTER — APPOINTMENT (OUTPATIENT)
Dept: GENERAL RADIOLOGY | Age: 62
End: 2022-10-03
Payer: COMMERCIAL

## 2022-10-03 VITALS
TEMPERATURE: 98.3 F | HEART RATE: 62 BPM | RESPIRATION RATE: 18 BRPM | DIASTOLIC BLOOD PRESSURE: 103 MMHG | OXYGEN SATURATION: 99 % | HEIGHT: 74 IN | SYSTOLIC BLOOD PRESSURE: 155 MMHG | WEIGHT: 255 LBS | BODY MASS INDEX: 32.73 KG/M2

## 2022-10-03 DIAGNOSIS — R07.9 CHEST PAIN, UNSPECIFIED TYPE: Primary | ICD-10-CM

## 2022-10-03 LAB
ABSOLUTE EOS #: 0.2 K/UL (ref 0–0.4)
ABSOLUTE LYMPH #: 1.6 K/UL (ref 1–4.8)
ABSOLUTE MONO #: 0.9 K/UL (ref 0.1–1.2)
ANION GAP SERPL CALCULATED.3IONS-SCNC: 11 MMOL/L (ref 9–17)
BASOPHILS # BLD: 2 % (ref 0–2)
BASOPHILS ABSOLUTE: 0.1 K/UL (ref 0–0.2)
BUN BLDV-MCNC: 15 MG/DL (ref 8–23)
CALCIUM SERPL-MCNC: 9.3 MG/DL (ref 8.6–10.4)
CHLORIDE BLD-SCNC: 102 MMOL/L (ref 98–107)
CO2: 25 MMOL/L (ref 20–31)
CREAT SERPL-MCNC: 1.05 MG/DL (ref 0.7–1.2)
D-DIMER QUANTITATIVE: 0.24 MG/L FEU
EOSINOPHILS RELATIVE PERCENT: 3 % (ref 1–4)
GFR AFRICAN AMERICAN: >60 ML/MIN
GFR NON-AFRICAN AMERICAN: >60 ML/MIN
GFR SERPL CREATININE-BSD FRML MDRD: NORMAL ML/MIN/{1.73_M2}
GLUCOSE BLD-MCNC: 96 MG/DL (ref 70–99)
HCT VFR BLD CALC: 46 % (ref 41–53)
HEMOGLOBIN: 15.7 G/DL (ref 13.5–17.5)
LYMPHOCYTES # BLD: 26 % (ref 24–44)
MCH RBC QN AUTO: 32.9 PG (ref 26–34)
MCHC RBC AUTO-ENTMCNC: 34.1 G/DL (ref 31–37)
MCV RBC AUTO: 96.7 FL (ref 80–100)
MONOCYTES # BLD: 14 % (ref 2–11)
PDW BLD-RTO: 13.3 % (ref 12.5–15.4)
PLATELET # BLD: 243 K/UL (ref 140–450)
PMV BLD AUTO: 7.6 FL (ref 6–12)
POTASSIUM SERPL-SCNC: 4.4 MMOL/L (ref 3.7–5.3)
PRO-BNP: 45 PG/ML
RBC # BLD: 4.76 M/UL (ref 4.5–5.9)
SEG NEUTROPHILS: 55 % (ref 36–66)
SEGMENTED NEUTROPHILS ABSOLUTE COUNT: 3.5 K/UL (ref 1.8–7.7)
SODIUM BLD-SCNC: 138 MMOL/L (ref 135–144)
TROPONIN, HIGH SENSITIVITY: 12 NG/L (ref 0–22)
TROPONIN, HIGH SENSITIVITY: 12 NG/L (ref 0–22)
WBC # BLD: 6.3 K/UL (ref 3.5–11)

## 2022-10-03 PROCEDURE — 85379 FIBRIN DEGRADATION QUANT: CPT

## 2022-10-03 PROCEDURE — 6370000000 HC RX 637 (ALT 250 FOR IP): Performed by: EMERGENCY MEDICINE

## 2022-10-03 PROCEDURE — 99285 EMERGENCY DEPT VISIT HI MDM: CPT

## 2022-10-03 PROCEDURE — 85025 COMPLETE CBC W/AUTO DIFF WBC: CPT

## 2022-10-03 PROCEDURE — 36415 COLL VENOUS BLD VENIPUNCTURE: CPT

## 2022-10-03 PROCEDURE — 80048 BASIC METABOLIC PNL TOTAL CA: CPT

## 2022-10-03 PROCEDURE — 93005 ELECTROCARDIOGRAM TRACING: CPT | Performed by: EMERGENCY MEDICINE

## 2022-10-03 PROCEDURE — 83880 ASSAY OF NATRIURETIC PEPTIDE: CPT

## 2022-10-03 PROCEDURE — 71045 X-RAY EXAM CHEST 1 VIEW: CPT

## 2022-10-03 PROCEDURE — 84484 ASSAY OF TROPONIN QUANT: CPT

## 2022-10-03 RX ORDER — ASPIRIN 81 MG/1
324 TABLET, CHEWABLE ORAL ONCE
Status: COMPLETED | OUTPATIENT
Start: 2022-10-03 | End: 2022-10-03

## 2022-10-03 RX ADMIN — ASPIRIN 81 MG CHEWABLE TABLET 324 MG: 81 TABLET CHEWABLE at 10:48

## 2022-10-03 ASSESSMENT — PAIN - FUNCTIONAL ASSESSMENT
PAIN_FUNCTIONAL_ASSESSMENT: NONE - DENIES PAIN
PAIN_FUNCTIONAL_ASSESSMENT: NONE - DENIES PAIN

## 2022-10-03 ASSESSMENT — LIFESTYLE VARIABLES
HOW OFTEN DO YOU HAVE A DRINK CONTAINING ALCOHOL: 4 OR MORE TIMES A WEEK
HOW MANY STANDARD DRINKS CONTAINING ALCOHOL DO YOU HAVE ON A TYPICAL DAY: 3 OR 4

## 2022-10-03 NOTE — TELEPHONE ENCOUNTER
ECC transferred patient to office for Mild Chest pain and Angina. Patient had a Stent placed last November because of 97% blockage and was told that he has 16% blockage in another but they were not going to do anything to it at this time. Cardiologist can not get the patient for 1 month for now    Angina started last night and started on the right side and then moved to his left side of the chest, he states that he was having jaw pain and left arm pain, lasted for 1 minutes in time. Patient states that then this went away after the 1 minute and has not returned.  Does state that for the last month he feels like he has a knot his his chest. Patient was advised to report to the ED department and be evaluated, also was advised that if everything at the ED came back clear and they sent him home to call the office and we can get him an appointment scheduled for a follow up with Dr. Flo Sibley

## 2022-10-03 NOTE — DISCHARGE INSTRUCTIONS
Followup with Dr. Priscila Harvey call today to set you followup testing  Return immediately if any worsening symptoms or any other concerns    Tell us how we did visit: [email or web address]   and let us know about your experience

## 2022-10-03 NOTE — ED PROVIDER NOTES
[id number] [facility] ED  [address] [facility] [address]  Phone: [phone]  Fax: [phone] [facility]      Pt Name: Phan Horne  MRN: [id number]  [place] 1960  Date of evaluation: 10/3/2022    CHIEF COMPLAINT       Chief Complaint   Patient presents with    Chest Pain       HISTORY OF PRESENT ILLNESS    Phan Horne is a 58 y.o. male who presents to the emergency room complaint of left precordial chest pain that started last night. Patient was watching football and his discomfort started very similar to the angina that he developed when he had his stents placed 6 or so months ago. He denies any associated shortness of breath. No fevers or chills no cough or congestion. Denies any other complaints. Currently pain 0 out of 10. Nothing makes his pain better or worse. He has not taken anything for it. Pain yesterday radiated down his left arm. REVIEW OF SYSTEMS       Constitutional: No fevers or chills   HEENT: No sore throat, rhinorrhea, or earache   Eyes: No blurry vision or double vision no drainage   Cardiovascular: positive chest pain no tachycardia  Respiratory: No wheezing or shortness of breath no cough   Gastrointestinal: No nausea, vomiting, diarrhea, constipation, or abdominal pain   : No hematuria or dysuria   Musculoskeletal: No swelling or pain   Skin: No rash   Neurological: No focal neurologic complaints, paresthesias, weakness, or headache     PAST MEDICAL HISTORY    has a past medical history of Hyperlipidemia and Hypertension. SURGICAL HISTORY      has a past surgical history that includes joint replacement (Left, 2013) and hip surgery (Right, 2013).     CURRENT MEDICATIONS       Previous Medications    ATORVASTATIN (LIPITOR) 40 MG TABLET    Take 1 tablet by mouth nightly    CLOPIDOGREL (PLAVIX) 75 MG TABLET    TAKE 1 TABLET BY MOUTH ONCE DAILY    METOPROLOL SUCCINATE (TOPROL XL) 50 MG EXTENDED RELEASE TABLET        SILDENAFIL (VIAGRA) 100 MG TABLET    Take 1 tablet by mouth daily as needed for Erectile Dysfunction    XARELTO 20 MG TABS TABLET    TAKE 1 TABLET BY MOUTH ONCE DAILY IN THE EVENING       ALLERGIES     has No Known Allergies. FAMILY HISTORY     has no family status information on file. family history is not on file. SOCIAL HISTORY      reports that he has quit smoking. His smoking use included cigarettes. He has never used smokeless tobacco. He reports current alcohol use. Drug: Marijuana Deny Goldberg). PHYSICAL EXAM       BP (!) 155/103   Pulse 62   Temp 98.3 °F (36.8 °C) (Oral)   Resp 18   Ht 6' 2\" (1.88 m)   Wt 115.7 kg (255 lb)   SpO2 99%   BMI 32.74 kg/m²     Constitutional: Alert, oriented x3, nontoxic, answering questions appropriately, acting properly for age, in no acute distress   HEENT: Extraocular muscles intact  Neck: Trachea midline   Cardiovascular: Regular rhythm and rate no murmurs   Respiratory: Clear to auscultation bilaterally no wheezes, rhonchi, rales, no respiratory distress no tachypnea no retractions no hypoxia  Gastrointestinal: Soft, nontender, nondistended, positive bowel sounds. No rebound, rigidity, or guarding. Musculoskeletal: No extremity pain or swelling no calf tenderness or asymmetry. Neurologic: Moving all 4 extremities without difficulty there are no gross focal neurologic deficits   Skin: Warm and dry       DIFFERENTIAL DIAGNOSIS/ MDM:     IV labs. EKG. Chest imaging.       HEART Risk Score:   1 = History   Highly Suspicious   2    Moderately Suspicious   1    Slight Suspicious  0  0 = EKG  Significant ST Depression 2    Nonspecific   1    Normal    0  1 = Age > or = 65   2    > 45 to < 65   1    < or = 45   0  1 = Risk Factors > or = 3   2    1 or 2    1    0    0  0 = Troponin > or = 3 times normal limit 2    > 1 and < 3 times limit  1    Normal    0  3 = Score  0 - 3    Low Risk     4 - 6    Moderate risk     7 - 10    High Risk  * Risk Factors include: Diabetes, Range    Glucose 96 70 - 99 mg/dL    BUN 15 8 - 23 mg/dL    Creatinine 1.05 0.70 - 1.20 mg/dL    Calcium 9.3 8.6 - 10.4 mg/dL    Sodium 138 135 - 144 mmol/L    Potassium 4.4 3.7 - 5.3 mmol/L    Chloride 102 98 - 107 mmol/L    CO2 25 20 - 31 mmol/L    Anion Gap 11 9 - 17 mmol/L    GFR Non-African American >60 >60 mL/min    GFR African American >60 >60 mL/min    GFR Comment         Troponin   Result Value Ref Range    Troponin, High Sensitivity 12 0 - 22 ng/L   Troponin   Result Value Ref Range    Troponin, High Sensitivity 12 0 - 22 ng/L   D-Dimer, Quantitative   Result Value Ref Range    D-Dimer, Quant 0.24 mg/L FEU   Brain Natriuretic Peptide   Result Value Ref Range    Pro-BNP 45 <300 pg/mL       Not indicated unless otherwise documented above    RADIOLOGY:   I reviewed the radiologist interpretations:    XR CHEST PORTABLE   Final Result   Portable frontal chest is within normal limits. Not indicated unless otherwise documented above    EMERGENCY DEPARTMENT COURSE:     The patient was given the following medications:  Orders Placed This Encounter   Medications    aspirin chewable tablet 324 mg        Vitals:   -------------------------  BP (!) 155/103   Pulse 62   Temp 98.3 °F (36.8 °C) (Oral)   Resp 18   Ht 6' 2\" (1.88 m)   Wt 115.7 kg (255 lb)   SpO2 99%   BMI 32.74 kg/m²     12 PM patient very anxious about his symptoms. He is currently asymptomatic with regards to chest pain. Normal troponin normal D-dimer normal EKG no ST elevations. Normal CBC and BMP. BNP of 45. Chest x-ray no infiltrate. Will discuss with patient's cardiologist as we wait for second troponin and EKG. 12:05 PM discussed with Cuca Mccullough who reviewed the patient's records and cath report.  0% occlusion to the circumflex and LAD right heart cath done with stents. Recommends discharge home with second troponin is negative and will arrange outpatient stress test.  Awaiting second troponin and EKG.     1:15 PM repeat EKG and troponin normal.  Will discharge follow-up. Patient very anxious about his testing. He states that 4 years ago he lied to his doctor to get a coronary CTA calcium test done which is what found his problem and he feels like he needs that again. Cardiologist states that he will set up an outpatient stress test.  He was advised to call today for an appointment. He asks if he should call Dr. Juanito Ramirez and tell him to order a CT angiogram coronary study again. I explained to him that he is to call him today to set up a stress test    The patient understands that at this time there is no evidence for a more malignant underlying process, but also understands that early in the process of an illness or injury, an emergency department workup can be falsely reassuring. Routine discharge counseling was given, and it is understood that worsening, changing or persistent symptoms should prompt an immediate call or follow up with their primary physician or return to the emergency department. The importance of appropriate follow up was also discussed. I have reviewed the disposition diagnosis. I have answered the questions and given discharge instructions. There was voiced understanding of these instructions and no further questions or complaints. CRITICAL CARE:    None    CONSULTS:    None    PROCEDURES:    None      OARRS Report if indicated             FINAL IMPRESSION      1.  Chest pain, unspecified type          DISPOSITION/PLAN   DISPOSITION Decision To Discharge 10/03/2022 01:16:20 PM        CONDITION ON DISPOSITION: STABLE       PATIENT REFERRED TO:  Dr. Juanito Ramirez    Call today      DISCHARGE MEDICATIONS:  New Prescriptions    No medications on file       (Please note that portions of this note were completed with a voice recognition program.  Efforts were made to edit the dictations but occasionally words are mis-transcribed.)    Andrzej Liu DO   Attending Emergency Physician     Andrzej Liu, DO  10/03/22 1319

## 2022-10-04 LAB
EKG ATRIAL RATE: 55 BPM
EKG ATRIAL RATE: 62 BPM
EKG P AXIS: 16 DEGREES
EKG P AXIS: 31 DEGREES
EKG P-R INTERVAL: 204 MS
EKG P-R INTERVAL: 206 MS
EKG Q-T INTERVAL: 418 MS
EKG Q-T INTERVAL: 446 MS
EKG QRS DURATION: 92 MS
EKG QRS DURATION: 92 MS
EKG QTC CALCULATION (BAZETT): 424 MS
EKG QTC CALCULATION (BAZETT): 426 MS
EKG R AXIS: -19 DEGREES
EKG R AXIS: -6 DEGREES
EKG T AXIS: -4 DEGREES
EKG T AXIS: 10 DEGREES
EKG VENTRICULAR RATE: 55 BPM
EKG VENTRICULAR RATE: 62 BPM

## 2022-10-04 NOTE — TELEPHONE ENCOUNTER
Patient is very upset with cardiology office requesting different referral. Still feeling weird sensation in chest. Seeing Cardio on 10/12, scheduled appointment with him the same day to see you. Please advise if there is anything else we can do for patient in the meantime.

## 2022-10-06 ENCOUNTER — APPOINTMENT (OUTPATIENT)
Dept: GENERAL RADIOLOGY | Age: 62
End: 2022-10-06
Payer: COMMERCIAL

## 2022-10-06 ENCOUNTER — HOSPITAL ENCOUNTER (OUTPATIENT)
Age: 62
Setting detail: OBSERVATION
Discharge: HOME | End: 2022-10-07
Attending: EMERGENCY MEDICINE | Admitting: EMERGENCY MEDICINE
Payer: COMMERCIAL

## 2022-10-06 DIAGNOSIS — R74.8 ELEVATED LIPASE: ICD-10-CM

## 2022-10-06 DIAGNOSIS — R07.9 CHEST PAIN, UNSPECIFIED TYPE: Primary | ICD-10-CM

## 2022-10-06 LAB
ALBUMIN SERPL-MCNC: 4.5 G/DL (ref 3.5–5.2)
ALBUMIN/GLOBULIN RATIO: 1.4 (ref 1–2.5)
ALP BLD-CCNC: 165 U/L (ref 40–129)
ALT SERPL-CCNC: 49 U/L (ref 5–41)
ANION GAP SERPL CALCULATED.3IONS-SCNC: 13 MMOL/L (ref 9–17)
AST SERPL-CCNC: 35 U/L
BILIRUB SERPL-MCNC: 0.4 MG/DL (ref 0.3–1.2)
BUN BLDV-MCNC: 9 MG/DL (ref 8–23)
CALCIUM SERPL-MCNC: 9.2 MG/DL (ref 8.6–10.4)
CHLORIDE BLD-SCNC: 103 MMOL/L (ref 98–107)
CO2: 24 MMOL/L (ref 20–31)
CREAT SERPL-MCNC: 0.97 MG/DL (ref 0.7–1.2)
GFR SERPL CREATININE-BSD FRML MDRD: >60 ML/MIN/1.73M2
GLUCOSE BLD-MCNC: 87 MG/DL (ref 70–99)
HCT VFR BLD CALC: 47.6 % (ref 40.7–50.3)
HEMOGLOBIN: 16.2 G/DL (ref 13–17)
LIPASE: 83 U/L (ref 13–60)
MAGNESIUM: 2.2 MG/DL (ref 1.6–2.6)
MCH RBC QN AUTO: 32.7 PG (ref 25.2–33.5)
MCHC RBC AUTO-ENTMCNC: 34 G/DL (ref 28.4–34.8)
MCV RBC AUTO: 96.2 FL (ref 82.6–102.9)
NRBC AUTOMATED: 0 PER 100 WBC
PDW BLD-RTO: 12.3 % (ref 11.8–14.4)
PLATELET # BLD: 264 K/UL (ref 138–453)
PMV BLD AUTO: 9.6 FL (ref 8.1–13.5)
POTASSIUM SERPL-SCNC: 4.4 MMOL/L (ref 3.7–5.3)
RBC # BLD: 4.95 M/UL (ref 4.21–5.77)
SARS-COV-2, RAPID: NOT DETECTED
SODIUM BLD-SCNC: 140 MMOL/L (ref 135–144)
SPECIMEN DESCRIPTION: NORMAL
TOTAL PROTEIN: 7.8 G/DL (ref 6.4–8.3)
TROPONIN, HIGH SENSITIVITY: 10 NG/L (ref 0–22)
TROPONIN, HIGH SENSITIVITY: 10 NG/L (ref 0–22)
TROPONIN, HIGH SENSITIVITY: 8 NG/L (ref 0–22)
WBC # BLD: 6.2 K/UL (ref 3.5–11.3)

## 2022-10-06 PROCEDURE — 83735 ASSAY OF MAGNESIUM: CPT

## 2022-10-06 PROCEDURE — 85027 COMPLETE CBC AUTOMATED: CPT

## 2022-10-06 PROCEDURE — 83690 ASSAY OF LIPASE: CPT

## 2022-10-06 PROCEDURE — 6370000000 HC RX 637 (ALT 250 FOR IP): Performed by: NURSE PRACTITIONER

## 2022-10-06 PROCEDURE — 87635 SARS-COV-2 COVID-19 AMP PRB: CPT

## 2022-10-06 PROCEDURE — 36415 COLL VENOUS BLD VENIPUNCTURE: CPT

## 2022-10-06 PROCEDURE — 2580000003 HC RX 258: Performed by: EMERGENCY MEDICINE

## 2022-10-06 PROCEDURE — 80053 COMPREHEN METABOLIC PANEL: CPT

## 2022-10-06 PROCEDURE — 71046 X-RAY EXAM CHEST 2 VIEWS: CPT

## 2022-10-06 PROCEDURE — 93005 ELECTROCARDIOGRAM TRACING: CPT | Performed by: EMERGENCY MEDICINE

## 2022-10-06 PROCEDURE — G0378 HOSPITAL OBSERVATION PER HR: HCPCS

## 2022-10-06 PROCEDURE — 84484 ASSAY OF TROPONIN QUANT: CPT

## 2022-10-06 PROCEDURE — 99285 EMERGENCY DEPT VISIT HI MDM: CPT

## 2022-10-06 PROCEDURE — 6370000000 HC RX 637 (ALT 250 FOR IP): Performed by: EMERGENCY MEDICINE

## 2022-10-06 RX ORDER — POLYETHYLENE GLYCOL 3350 17 G/17G
17 POWDER, FOR SOLUTION ORAL DAILY PRN
Status: DISCONTINUED | OUTPATIENT
Start: 2022-10-06 | End: 2022-10-07 | Stop reason: HOSPADM

## 2022-10-06 RX ORDER — DILTIAZEM HYDROCHLORIDE 120 MG/1
120 CAPSULE, EXTENDED RELEASE ORAL DAILY
COMMUNITY

## 2022-10-06 RX ORDER — ONDANSETRON 2 MG/ML
4 INJECTION INTRAMUSCULAR; INTRAVENOUS EVERY 6 HOURS PRN
Status: DISCONTINUED | OUTPATIENT
Start: 2022-10-06 | End: 2022-10-07 | Stop reason: HOSPADM

## 2022-10-06 RX ORDER — CLOPIDOGREL BISULFATE 75 MG/1
75 TABLET ORAL ONCE
Status: DISCONTINUED | OUTPATIENT
Start: 2022-10-07 | End: 2022-10-07 | Stop reason: HOSPADM

## 2022-10-06 RX ORDER — ACETAMINOPHEN 325 MG/1
650 TABLET ORAL EVERY 6 HOURS PRN
Status: DISCONTINUED | OUTPATIENT
Start: 2022-10-06 | End: 2022-10-07 | Stop reason: HOSPADM

## 2022-10-06 RX ORDER — ONDANSETRON 4 MG/1
4 TABLET, ORALLY DISINTEGRATING ORAL EVERY 8 HOURS PRN
Status: DISCONTINUED | OUTPATIENT
Start: 2022-10-06 | End: 2022-10-07 | Stop reason: HOSPADM

## 2022-10-06 RX ORDER — SODIUM CHLORIDE 0.9 % (FLUSH) 0.9 %
5-40 SYRINGE (ML) INJECTION EVERY 12 HOURS SCHEDULED
Status: DISCONTINUED | OUTPATIENT
Start: 2022-10-06 | End: 2022-10-07 | Stop reason: HOSPADM

## 2022-10-06 RX ORDER — METOPROLOL SUCCINATE 50 MG/1
50 TABLET, EXTENDED RELEASE ORAL DAILY
Status: DISCONTINUED | OUTPATIENT
Start: 2022-10-06 | End: 2022-10-07 | Stop reason: HOSPADM

## 2022-10-06 RX ORDER — CHLORAL HYDRATE 500 MG
3000 CAPSULE ORAL 2 TIMES DAILY
COMMUNITY

## 2022-10-06 RX ORDER — METOPROLOL SUCCINATE 50 MG/1
50 TABLET, EXTENDED RELEASE ORAL DAILY
Status: DISCONTINUED | OUTPATIENT
Start: 2022-10-07 | End: 2022-10-06

## 2022-10-06 RX ORDER — SODIUM CHLORIDE 9 MG/ML
INJECTION, SOLUTION INTRAVENOUS PRN
Status: DISCONTINUED | OUTPATIENT
Start: 2022-10-06 | End: 2022-10-07 | Stop reason: HOSPADM

## 2022-10-06 RX ORDER — ATORVASTATIN CALCIUM 80 MG/1
40 TABLET, FILM COATED ORAL NIGHTLY
Status: DISCONTINUED | OUTPATIENT
Start: 2022-10-06 | End: 2022-10-07 | Stop reason: HOSPADM

## 2022-10-06 RX ORDER — SODIUM CHLORIDE 0.9 % (FLUSH) 0.9 %
5-40 SYRINGE (ML) INJECTION PRN
Status: DISCONTINUED | OUTPATIENT
Start: 2022-10-06 | End: 2022-10-07 | Stop reason: HOSPADM

## 2022-10-06 RX ORDER — ENOXAPARIN SODIUM 100 MG/ML
30 INJECTION SUBCUTANEOUS 2 TIMES DAILY
Status: DISCONTINUED | OUTPATIENT
Start: 2022-10-06 | End: 2022-10-06

## 2022-10-06 RX ORDER — ACETAMINOPHEN 650 MG/1
650 SUPPOSITORY RECTAL EVERY 6 HOURS PRN
Status: DISCONTINUED | OUTPATIENT
Start: 2022-10-06 | End: 2022-10-07 | Stop reason: HOSPADM

## 2022-10-06 RX ADMIN — ATORVASTATIN CALCIUM 40 MG: 80 TABLET, FILM COATED ORAL at 20:41

## 2022-10-06 RX ADMIN — METOPROLOL SUCCINATE 50 MG: 50 TABLET, FILM COATED, EXTENDED RELEASE ORAL at 21:16

## 2022-10-06 RX ADMIN — RIVAROXABAN 20 MG: 20 TABLET, FILM COATED ORAL at 21:16

## 2022-10-06 RX ADMIN — SODIUM CHLORIDE, PRESERVATIVE FREE 10 ML: 5 INJECTION INTRAVENOUS at 21:54

## 2022-10-06 ASSESSMENT — PAIN SCALES - GENERAL
PAINLEVEL_OUTOF10: 8
PAINLEVEL_OUTOF10: 3
PAINLEVEL_OUTOF10: 3

## 2022-10-06 ASSESSMENT — PAIN DESCRIPTION - LOCATION
LOCATION: CHEST;JAW;ARM
LOCATION: CHEST

## 2022-10-06 ASSESSMENT — PAIN DESCRIPTION - FREQUENCY
FREQUENCY: CONTINUOUS
FREQUENCY: INTERMITTENT

## 2022-10-06 ASSESSMENT — ENCOUNTER SYMPTOMS
ABDOMINAL PAIN: 0
DIARRHEA: 0
SHORTNESS OF BREATH: 0
VOMITING: 0
NAUSEA: 1
BACK PAIN: 1

## 2022-10-06 ASSESSMENT — HEART SCORE: ECG: 1

## 2022-10-06 ASSESSMENT — PAIN DESCRIPTION - ORIENTATION: ORIENTATION: RIGHT;LEFT;MID

## 2022-10-06 ASSESSMENT — PAIN DESCRIPTION - PAIN TYPE: TYPE: ACUTE PAIN

## 2022-10-06 ASSESSMENT — PAIN - FUNCTIONAL ASSESSMENT: PAIN_FUNCTIONAL_ASSESSMENT: 0-10

## 2022-10-06 ASSESSMENT — PAIN DESCRIPTION - DESCRIPTORS
DESCRIPTORS: PRESSURE
DESCRIPTORS: TIGHTNESS

## 2022-10-06 NOTE — PROGRESS NOTES
SPIRITUAL CARE DEPARTMENT - [facility] [address]  PROGRESS NOTE    Shift date: 10/06/22  Shift day: Thursday   Shift # 2    Room # 0086/2483-71   Name: Raymond Prasad                Anglican:    [facility] orth[facility]:     Referral: Routine Visit    Admit Date & Time: 10/6/2022  1:11 PM    Assessment:  Raymond Prasad is a 58 y.o. male     Intervention:  Writer introduced self and title as . Patient did not appear to mind  presence and engaged in conversation about his health and its impact. Writer offered space for patient  to express feelings, needs, and concerns and provided a ministry presence. Outcome:  Patient appeared receptive to  visit. Plan:  Chaplains will remain available to offer spiritual and emotional support as needed.       Electronically signed by Danisha Hart on 10/6/2022 at 5:26 PM.  [facility]  [phone]

## 2022-10-06 NOTE — ED PROVIDER NOTES
[facility] ED  Emergency Department Encounter  Emergency Medicine Resident     Pt Name:Lalo Del Rio  MRN: [id number]  [place] 1960  Date of evaluation: 10/6/22  PCP:  Stephen Argueta MD      CHIEF COMPLAINT       Chief Complaint   Patient presents with    Chest Pain       HISTORY OF PRESENT ILLNESS  (Location/Symptom, Timing/Onset, Context/Setting, Quality, Duration, Modifying Factors, Severity.)      Dat Neighbor is a 58 y.o. male who presents with chest pain since Sunday. Patient was seen at [facility] on Monday and cardiac work up was negative at that time. He presents today because pain has continued. He describes pain as pressure that is bilateral and radiates to L arm and jaw. He took 325mg ASA this AM. He has a history of Heart Cath 11/21:  with Mid RCA 85% stenosis, Successful PTCA -CEE mid RCA, Preserved LV function. On xeralto and plavix. He has some nausea, no emesis or diarrhea. No shortness of breath. Chest pain does not increase with inspiration. He is very anxious on exam.     PAST MEDICAL / SURGICAL / SOCIAL / FAMILY HISTORY      has a past medical history of Hyperlipidemia and Hypertension. has a past surgical history that includes joint replacement (Left, 2013) and hip surgery (Right, 2013).       Social History     Socioeconomic History    Marital status: Single     Spouse name: Not on file    Number of children: Not on file    Years of education: Not on file    Highest education level: Not on file   Occupational History    Not on file   Tobacco Use    Smoking status: Former     Types: Cigarettes    Smokeless tobacco: Never   Vaping Use    Vaping Use: Some days   Substance and Sexual Activity    Alcohol use: Yes     Comment: 4or more drinks daily    Drug use: Not on file    Sexual activity: Not on file   Other Topics Concern    Not on file   Social History Narrative    Not on file     Social Determinants of Health     Financial Resource Strain: Low Risk Difficulty of Paying Living Expenses: Not hard at all   Food Insecurity: No Food Insecurity    Worried About Running Out of Food in the Last Year: Never true    Ran Out of Food in the Last Year: Never true   Transportation Needs: Not on file   Physical Activity: Not on file   Stress: Not on file   Social Connections: Not on file   Intimate Partner Violence: Not on file   Housing Stability: Not on file       History reviewed. No pertinent family history. Allergies: Other    Home Medications:  Prior to Admission medications    Medication Sig Start Date End Date Taking? Authorizing Provider   atorvastatin (LIPITOR) 40 MG tablet Take 1 tablet by mouth nightly 7/8/22   Jovi Brantley MD   XARELTO 20 MG TABS tablet TAKE 1 TABLET BY MOUTH ONCE DAILY IN THE EVENING 3/9/22   Historical Provider, MD   metoprolol succinate (TOPROL XL) 50 MG extended release tablet  3/8/22   Historical Provider, MD   clopidogrel (PLAVIX) 75 MG tablet TAKE 1 TABLET BY MOUTH ONCE DAILY 3/1/22   Historical Provider, MD   sildenafil (VIAGRA) 100 MG tablet Take 1 tablet by mouth daily as needed for Erectile Dysfunction  Patient not taking: Reported on 10/6/2022 11/16/21   Stephen Argueta MD       REVIEW OF SYSTEMS    (2-9 systems for level 4, 10 or more for level 5)      Review of Systems   Constitutional:  Negative for chills and fever. HENT:  Negative for congestion. Respiratory:  Negative for shortness of breath. Cardiovascular:  Positive for chest pain. Negative for leg swelling. Gastrointestinal:  Positive for nausea. Negative for abdominal pain, diarrhea and vomiting. Genitourinary:  Negative for dysuria and hematuria. Musculoskeletal:  Positive for back pain. Allergic/Immunologic: Negative for immunocompromised state. Neurological:  Negative for dizziness, light-headedness and headaches. Hematological:  Bruises/bleeds easily.      PHYSICAL EXAM   (up to 7 for level 4, 8 or more for level 5)      INITIAL VITALS: BP (!) 173/94   Pulse 64   Temp 97.7 °F (36.5 °C) (Oral)   Resp 22   Ht 6' 2\" (1.88 m)   Wt 255 lb (115.7 kg)   SpO2 99%   BMI 32.74 kg/m²     Physical Exam  Constitutional:       Appearance: He is not diaphoretic. HENT:      Head: Normocephalic and atraumatic. Eyes:      Extraocular Movements: Extraocular movements intact. Pupils: Pupils are equal, round, and reactive to light. Cardiovascular:      Rate and Rhythm: Normal rate and regular rhythm. Heart sounds: Normal heart sounds. Pulmonary:      Effort: Pulmonary effort is normal.      Breath sounds: Normal breath sounds. No wheezing. Abdominal:      General: There is no distension. Palpations: Abdomen is soft. Tenderness: There is no abdominal tenderness. Musculoskeletal:      Right lower leg: No edema. Left lower leg: No edema. Skin:     General: Skin is warm. Neurological:      General: No focal deficit present. Mental Status: He is alert. Psychiatric:      Comments: Anxious        DIFFERENTIAL  DIAGNOSIS     PLAN (LABS / IMAGING / EKG):  Orders Placed This Encounter   Procedures    XR CHEST (2 VW)    CBC    Comprehensive Metabolic Panel    Troponin    Magnesium    Lipase    CBC with Auto Differential    Comprehensive Metabolic Panel    ADULT DIET;  Regular; Low Fat/Low Chol/High Fiber/DORA; No Added Salt (3-4 gm)    Diet NPO Exceptions are: Sips of Water with Meds    Cardiac Monitor    Pulse Oximetry    Vital signs per unit routine    Vital signs (specify frequency)    Up as tolerated    Full Code    Inpatient consult to Cardiology    Initiate Oxygen Therapy Protocol    EKG 12 Lead    EKG 12 Lead    Place in Observation Service       MEDICATIONS ORDERED:  Orders Placed This Encounter   Medications    sodium chloride flush 0.9 % injection 5-40 mL    sodium chloride flush 0.9 % injection 5-40 mL    0.9 % sodium chloride infusion    DISCONTD: enoxaparin Sodium (LOVENOX) injection 30 mg     Order Specific Question:   Indication of Use     Answer:   Prophylaxis-DVT/PE    OR Linked Order Group     ondansetron (ZOFRAN-ODT) disintegrating tablet 4 mg     ondansetron (ZOFRAN) injection 4 mg    polyethylene glycol (GLYCOLAX) packet 17 g    OR Linked Order Group     acetaminophen (TYLENOL) tablet 650 mg     acetaminophen (TYLENOL) suppository 650 mg    rivaroxaban (XARELTO) tablet 20 mg     Order Specific Question:   Indication of Use     Answer:   History of DVT/PE (indefinite)    atorvastatin (LIPITOR) tablet 40 mg    metoprolol succinate (TOPROL XL) extended release tablet 50 mg    clopidogrel (PLAVIX) tablet 75 mg       MDM: Will admit patient to observation for cardiology evaluation. He continues to have chest pain. Long discussion with patient regarding he is not actively having an MI at this time. Elevated lipase, patient admits to drinking 3+ drinks daily. Physical exam normal with exception of mild abd tenderness, chest pain not reproducible. EKG grossly unchanged from previous. Trop 10. Patient took  mg at home earlier. Patient is agreeable to observation with cardiac evaluation.      DIAGNOSTIC RESULTS / EMERGENCY DEPARTMENT COURSE / MDM   LAB RESULTS:  Results for orders placed or performed during the hospital encounter of 10/06/22   CBC   Result Value Ref Range    WBC 6.2 3.5 - 11.3 k/uL    RBC 4.95 4.21 - 5.77 m/uL    Hemoglobin 16.2 13.0 - 17.0 g/dL    Hematocrit 47.6 40.7 - 50.3 %    MCV 96.2 82.6 - 102.9 fL    MCH 32.7 25.2 - 33.5 pg    MCHC 34.0 28.4 - 34.8 g/dL    RDW 12.3 11.8 - 14.4 %    Platelets 998 403 - 560 k/uL    MPV 9.6 8.1 - 13.5 fL    NRBC Automated 0.0 0.0 per 100 WBC   Comprehensive Metabolic Panel   Result Value Ref Range    Glucose 87 70 - 99 mg/dL    BUN 9 8 - 23 mg/dL    Creatinine 0.97 0.70 - 1.20 mg/dL    Est, Glom Filt Rate >60 >60 mL/min/1.73m2    Calcium 9.2 8.6 - 10.4 mg/dL    Sodium 140 135 - 144 mmol/L    Potassium 4.4 3.7 - 5.3 mmol/L    Chloride 103 98 - 107 mmol/L    CO2 24 20 - 31 mmol/L    Anion Gap 13 9 - 17 mmol/L    Alkaline Phosphatase 165 (H) 40 - 129 U/L    ALT 49 (H) 5 - 41 U/L    AST 35 <40 U/L    Total Bilirubin 0.4 0.3 - 1.2 mg/dL    Total Protein 7.8 6.4 - 8.3 g/dL    Albumin 4.5 3.5 - 5.2 g/dL    Albumin/Globulin Ratio 1.4 1.0 - 2.5   Troponin   Result Value Ref Range    Troponin, High Sensitivity 10 0 - 22 ng/L   Magnesium   Result Value Ref Range    Magnesium 2.2 1.6 - 2.6 mg/dL   Lipase   Result Value Ref Range    Lipase 83 (H) 13 - 60 U/L       IMPRESSION: 65yo M w/ hx of cardiac stent w/ chest pain for 4 days. Will obtain cardiac work up. If negative will admit to observation for cardiology eval.     RADIOLOGY:  XR CHEST (2 VW)   Final Result   No acute process.              EKG  EKG Interpretation    Interpreted by emergency department physician    Rhythm: 1 degree AV block  Rate: normal  Axis: normal  Ectopy: none  Conduction: 1st degree AV block  ST Segments: normal  T Waves: non specific changes  Q Waves: none    Clinical Impression: non-specific EKG    Lazaro Holes, DO      All EKG's are interpreted by the Emergency Department Physician who either signs or Co-signs this chart in the absence of a cardiologist.    EMERGENCY DEPARTMENT COURSE:      ED Course as of 10/06/22 1748   Thu Oct 06, 2022   1414 Lipase(!):    Lipase 83(!) [SK]   1414 Magnesium:    Magnesium 2.2 [SK]   1414 Troponin:    Troponin, High Sensitivity 10 [SK]   1414 CBC:    WBC 6.2   RBC 4.95   Hemoglobin Quant 16.2   Hematocrit 47.6   MCV 96.2   MCH 32.7   MCHC 34.0   RDW 12.3   Platelet Count 241   MPV 9.6   NRBC Automated 0.0 [SK]   1414 Comprehensive Metabolic Panel(!):    Glucose, Random 87   BUN,BUNPL 9   Creatinine 0.97   Est, Glom Filt Rate >60   CALCIUM, SERUM, 874160 9.2   Sodium 140   Potassium 4.4   Chloride 103   CO2 24   Anion Gap 13   Alk Phos 165(!)   ALT 49(!)   AST 35   Bilirubin 0.4   Total Protein 7.8   Albumin 4.5   ALBUMIN/GLOBULIN RATIO 1.4 [SK]   1443 Extensive conversation with patient regarding elevated lipase, chest pain. He is requesting CTA of coronary arteries at this time. Troponin normal. CXR clear. Labs grossly normal with exception of elevated lipase  and alk phos which is roughly baseline.  [SK]      ED Course User Index  [SK] Ron Mathews DO     History: 1  EC  Patient Age: 1  *Risk factors for Atherosclerotic disease: Hypertension; Hypercholesterolemia; Coronary Artery Disease  Risk Factors: 2  Troponin: 0  Heart Score Total: 5      No notes of EC Admission Criteria type on file. PROCEDURES:      CONSULTS:  IP CONSULT TO CARDIOLOGY    CRITICAL CARE:      FINAL IMPRESSION      1. Chest pain, unspecified type    2. Elevated lipase          DISPOSITION / PLAN     DISPOSITION Admitted 10/06/2022 02:49:25 PM      PATIENT REFERRED TO:  No follow-up provider specified.     DISCHARGE MEDICATIONS:  New Prescriptions    No medications on file       Ron Mathews DO  Emergency Medicine Resident    (Please note that portions of thisnote were completed with a voice recognition program.  Efforts were made to edit the dictations but occasionally words are mis-transcribed.)        Ron Mathews DO  Resident  10/06/22 [address], DO  Resident  10/06/22 4884

## 2022-10-06 NOTE — ED TRIAGE NOTES
Patient presented to the ED today with complaints of again. Patient states that symptoms presented 2 months ago. Patient had a cardiac stent placed 11/2021. Patient states that he had a 90% blockage.

## 2022-10-06 NOTE — ED PROVIDER NOTES
[facility]     Emergency Department     Faculty Attestation    I performed a history and physical examination of the patient and discussed management with the resident. I have reviewed and agree with the residents findings including all diagnostic interpretations, and treatment plans as written at the time of my review. Any areas of disagreement are noted on the chart. I was personally present for the key portions of any procedures. I have documented in the chart those procedures where I was not present during the key portions. For Physician Assistant/ Nurse Practitioner cases/documentation I have personally evaluated this patient and have completed at least one if not all key elements of the E/M (history, physical exam, and MDM). Additional findings are as noted. Primary Care Physician: Prem Rodgers MD    History: This is a 58 y.o. male who presents to the Emergency Department with complaint of pain. Patient presents emergency room complaining of sharp chest pain on the left side of his chest with pressure-like on the right side and midsternal.  Patient had similar symptoms 3 days ago and was seen in the emergency department for at that time. At that time his chest pain resolved. He does have a history of cardiac stent placed in November 2021. Patient also complains of pain in his left arm. Physical:   height is 6' 2\" (1.88 m) and weight is 255 lb (115.7 kg). His oral temperature is 97.7 °F (36.5 °C). His blood pressure is 178/96 (abnormal) and his pulse is 63. His respiration is 22 and oxygen saturation is 95%.   Lungs are clear to auscultation bilaterally, heart regular rate and rhythm, abdomen soft nontender    Impression: Chest pain    Plan: Chest x-ray, EKG, CBC, BMP, troponin      Heart Score:         Score 0 - 3 = 2.5%  MACE over next 6 wks = Discharge home  Score 4 - 6 = 20.3%  MACE over next 6 wks = Obs admit  Score 7 - 10 = 72.7% MACE over next 6 wks = Early invasive Rx       EKG Interpretation    Interpreted by me  Sinus rhythm with a first-degree AV block and a ventricular to 61, normal QRS duration, minimal voltage criteria for left ventricular hypertrophy, normal QT corrected  Compared EKG of October 3, 2022, ventricular rate has increased by 6, first-degree AV block is new      (Please note that portions of this note were completed with a voice recognition program.  Efforts were made to edit the dictations but occasionally words are mis-transcribed.)    Travis Nichols.  Arlette Cain MD, [facility]  Attending Emergency Medicine Physician        Mary Wong MD  10/07/22 9354

## 2022-10-07 ENCOUNTER — APPOINTMENT (OUTPATIENT)
Dept: CARDIAC CATH/INVASIVE PROCEDURES | Age: 62
End: 2022-10-07
Payer: COMMERCIAL

## 2022-10-07 VITALS
RESPIRATION RATE: 16 BRPM | WEIGHT: 255 LBS | TEMPERATURE: 98.3 F | BODY MASS INDEX: 32.73 KG/M2 | SYSTOLIC BLOOD PRESSURE: 147 MMHG | HEIGHT: 74 IN | HEART RATE: 56 BPM | OXYGEN SATURATION: 97 % | DIASTOLIC BLOOD PRESSURE: 90 MMHG

## 2022-10-07 LAB
ABSOLUTE EOS #: 0.22 K/UL (ref 0–0.44)
ABSOLUTE IMMATURE GRANULOCYTE: <0.03 K/UL (ref 0–0.3)
ABSOLUTE LYMPH #: 1.68 K/UL (ref 1.1–3.7)
ABSOLUTE MONO #: 0.98 K/UL (ref 0.1–1.2)
ALBUMIN SERPL-MCNC: 3.9 G/DL (ref 3.5–5.2)
ALBUMIN/GLOBULIN RATIO: 1.3 (ref 1–2.5)
ALP BLD-CCNC: 142 U/L (ref 40–129)
ALT SERPL-CCNC: 41 U/L (ref 5–41)
ANION GAP SERPL CALCULATED.3IONS-SCNC: 9 MMOL/L (ref 9–17)
AST SERPL-CCNC: 29 U/L
BASOPHILS # BLD: 1 % (ref 0–2)
BASOPHILS ABSOLUTE: 0.07 K/UL (ref 0–0.2)
BILIRUB SERPL-MCNC: 0.3 MG/DL (ref 0.3–1.2)
BUN BLDV-MCNC: 14 MG/DL (ref 8–23)
CALCIUM SERPL-MCNC: 8.7 MG/DL (ref 8.6–10.4)
CHLORIDE BLD-SCNC: 106 MMOL/L (ref 98–107)
CO2: 23 MMOL/L (ref 20–31)
CREAT SERPL-MCNC: 0.98 MG/DL (ref 0.7–1.2)
EOSINOPHILS RELATIVE PERCENT: 4 % (ref 1–4)
GFR SERPL CREATININE-BSD FRML MDRD: >60 ML/MIN/1.73M2
GLUCOSE BLD-MCNC: 113 MG/DL (ref 70–99)
HCT VFR BLD CALC: 43.8 % (ref 40.7–50.3)
HEMOGLOBIN: 15.2 G/DL (ref 13–17)
IMMATURE GRANULOCYTES: 0 %
LYMPHOCYTES # BLD: 30 % (ref 24–43)
MCH RBC QN AUTO: 32.6 PG (ref 25.2–33.5)
MCHC RBC AUTO-ENTMCNC: 34.7 G/DL (ref 28.4–34.8)
MCV RBC AUTO: 94 FL (ref 82.6–102.9)
MONOCYTES # BLD: 17 % (ref 3–12)
NRBC AUTOMATED: 0 PER 100 WBC
PDW BLD-RTO: 12.4 % (ref 11.8–14.4)
PLATELET # BLD: 217 K/UL (ref 138–453)
PMV BLD AUTO: 9.5 FL (ref 8.1–13.5)
POTASSIUM SERPL-SCNC: 4.1 MMOL/L (ref 3.7–5.3)
RBC # BLD: 4.66 M/UL (ref 4.21–5.77)
SEG NEUTROPHILS: 48 % (ref 36–65)
SEGMENTED NEUTROPHILS ABSOLUTE COUNT: 2.69 K/UL (ref 1.5–8.1)
SODIUM BLD-SCNC: 138 MMOL/L (ref 135–144)
TOTAL PROTEIN: 6.9 G/DL (ref 6.4–8.3)
WBC # BLD: 5.7 K/UL (ref 3.5–11.3)

## 2022-10-07 PROCEDURE — 6370000000 HC RX 637 (ALT 250 FOR IP): Performed by: INTERNAL MEDICINE

## 2022-10-07 PROCEDURE — C1894 INTRO/SHEATH, NON-LASER: HCPCS

## 2022-10-07 PROCEDURE — 99153 MOD SED SAME PHYS/QHP EA: CPT

## 2022-10-07 PROCEDURE — 36415 COLL VENOUS BLD VENIPUNCTURE: CPT

## 2022-10-07 PROCEDURE — 80053 COMPREHEN METABOLIC PANEL: CPT

## 2022-10-07 PROCEDURE — C1892 INTRO/SHEATH,FIXED,PEEL-AWAY: HCPCS

## 2022-10-07 PROCEDURE — G0378 HOSPITAL OBSERVATION PER HR: HCPCS

## 2022-10-07 PROCEDURE — C1769 GUIDE WIRE: HCPCS

## 2022-10-07 PROCEDURE — 6360000004 HC RX CONTRAST MEDICATION

## 2022-10-07 PROCEDURE — 6360000002 HC RX W HCPCS

## 2022-10-07 PROCEDURE — 85025 COMPLETE CBC W/AUTO DIFF WBC: CPT

## 2022-10-07 PROCEDURE — 2500000003 HC RX 250 WO HCPCS

## 2022-10-07 PROCEDURE — C1887 CATHETER, GUIDING: HCPCS

## 2022-10-07 PROCEDURE — 2580000003 HC RX 258: Performed by: EMERGENCY MEDICINE

## 2022-10-07 PROCEDURE — 99152 MOD SED SAME PHYS/QHP 5/>YRS: CPT

## 2022-10-07 PROCEDURE — 93458 L HRT ARTERY/VENTRICLE ANGIO: CPT

## 2022-10-07 PROCEDURE — 93005 ELECTROCARDIOGRAM TRACING: CPT | Performed by: EMERGENCY MEDICINE

## 2022-10-07 PROCEDURE — 2709999900 HC NON-CHARGEABLE SUPPLY

## 2022-10-07 PROCEDURE — 93571 IV DOP VEL&/PRESS C FLO 1ST: CPT

## 2022-10-07 RX ORDER — ACETAMINOPHEN 325 MG/1
650 TABLET ORAL EVERY 4 HOURS PRN
Status: CANCELLED | OUTPATIENT
Start: 2022-10-07

## 2022-10-07 RX ORDER — SODIUM CHLORIDE 0.9 % (FLUSH) 0.9 %
5-40 SYRINGE (ML) INJECTION EVERY 12 HOURS SCHEDULED
Status: CANCELLED | OUTPATIENT
Start: 2022-10-07

## 2022-10-07 RX ORDER — ISOSORBIDE MONONITRATE 30 MG/1
30 TABLET, EXTENDED RELEASE ORAL DAILY
Qty: 30 TABLET | Refills: 3 | Status: SHIPPED | OUTPATIENT
Start: 2022-10-08

## 2022-10-07 RX ORDER — METOPROLOL SUCCINATE 50 MG/1
50 TABLET, EXTENDED RELEASE ORAL DAILY
Status: CANCELLED | OUTPATIENT
Start: 2022-10-07

## 2022-10-07 RX ORDER — SODIUM CHLORIDE 9 MG/ML
INJECTION, SOLUTION INTRAVENOUS PRN
Status: CANCELLED | OUTPATIENT
Start: 2022-10-07

## 2022-10-07 RX ORDER — SODIUM CHLORIDE 0.9 % (FLUSH) 0.9 %
5-40 SYRINGE (ML) INJECTION PRN
Status: CANCELLED | OUTPATIENT
Start: 2022-10-07

## 2022-10-07 RX ORDER — ISOSORBIDE MONONITRATE 30 MG/1
30 TABLET, EXTENDED RELEASE ORAL DAILY
Status: DISCONTINUED | OUTPATIENT
Start: 2022-10-07 | End: 2022-10-07 | Stop reason: HOSPADM

## 2022-10-07 RX ORDER — DILTIAZEM HYDROCHLORIDE 120 MG/1
120 CAPSULE, COATED, EXTENDED RELEASE ORAL DAILY
Status: DISCONTINUED | OUTPATIENT
Start: 2022-10-07 | End: 2022-10-07 | Stop reason: HOSPADM

## 2022-10-07 RX ORDER — DILTIAZEM HYDROCHLORIDE 60 MG/1
120 CAPSULE, EXTENDED RELEASE ORAL DAILY
Status: CANCELLED | OUTPATIENT
Start: 2022-10-07

## 2022-10-07 RX ORDER — CLOPIDOGREL BISULFATE 75 MG/1
1 TABLET ORAL DAILY
Status: CANCELLED | OUTPATIENT
Start: 2022-10-07

## 2022-10-07 RX ORDER — CLOPIDOGREL BISULFATE 75 MG/1
75 TABLET ORAL DAILY
Status: DISCONTINUED | OUTPATIENT
Start: 2022-10-07 | End: 2022-10-07 | Stop reason: HOSPADM

## 2022-10-07 RX ORDER — ATORVASTATIN CALCIUM 40 MG/1
40 TABLET, FILM COATED ORAL NIGHTLY
Status: CANCELLED | OUTPATIENT
Start: 2022-10-07

## 2022-10-07 RX ADMIN — SODIUM CHLORIDE, PRESERVATIVE FREE 10 ML: 5 INJECTION INTRAVENOUS at 09:51

## 2022-10-07 RX ADMIN — ISOSORBIDE MONONITRATE 30 MG: 30 TABLET ORAL at 13:24

## 2022-10-07 ASSESSMENT — PAIN SCALES - GENERAL
PAINLEVEL_OUTOF10: 0
PAINLEVEL_OUTOF10: 1

## 2022-10-07 ASSESSMENT — ENCOUNTER SYMPTOMS
COUGH: 0
VOMITING: 0
DIARRHEA: 0
SORE THROAT: 0
SHORTNESS OF BREATH: 0
RHINORRHEA: 0
CONSTIPATION: 0
NAUSEA: 0
ABDOMINAL PAIN: 0

## 2022-10-07 NOTE — CONSULTS
[facility]    Consult / H&P               Today's Date: 10/7/2022  Patient Name: Prashant Patton  Date of admission: 10/6/2022  1:11 PM  Patient's age: 58 y.o., 1960  Admission Dx: Chest pain [R07.9]  Elevated lipase [R74.8]  Chest pain, unspecified type [R07.9]    Reason for Consult: Chest pain, history of stent last year    Requesting Physician: Luis Manuel Morales MD    CHIEF COMPLAINT: Chest pain    History Obtained From:  patient, EMR    HISTORY OF PRESENT ILLNESS:      The patient is a 58 y.o. male who presents to the hospital with chest pain. Of note patient recently seen in [facility] ED on 10/3 for chest pain, cardiac work-up at that time was negative and patient was discharged with plans to follow-up with cardiology. Patient stating that he has been dealing with this pain for 5 days. The patient rates the pain 4-5/10 it started on his right side but now has been fixed on his left chest without radiation. The patient's chest pain does not improve with rest but does get worse when he is exerting himself. Patient describes the pain as a pressure and also states that he gets an occasional left lateral chest pain that feels like a knife poking into his side. Patient states that he becomes diaphoretic when he has the pain. The patient also describes worse chest tightness when he bends over to pick something up. Patient states that he had his first episode of chest pain 4 years ago and nothing was done at that time. He also complains of feelings of heart racing which happens about 2 times a week even after he had an ablation in January of this year. Cardiology was consulted for patient's chest pain and his history of stent placement last year. Patient has a significant cardiac history of CAD s/p CEE, HTN, HLD. Patient had cardiac cath 11/2021 which showed single-vessel CAD, mid RCA with 85% stenosis and successful PTCA-CEE to mid RCA.   Patient states he is compliant with all medications. There is a significant family history, patient's grandfather had a fatal MI at 52, his father had an MI at 43years old, and his mother had heart failure. In the ED patient was noted to be hypertensive. EKG showed sinus bradycardia and troponins were negative x3. Past Medical History:   has a past medical history of Hyperlipidemia and Hypertension. Past Surgical History:   has a past surgical history that includes joint replacement (Left, 2013) and hip surgery (Right, 2013). Home Medications:    Prior to Admission medications    Medication Sig Start Date End Date Taking? Authorizing Provider   dilTIAZem (CARDIZEM 12 HR) 120 MG extended release capsule Take 120 mg by mouth daily   Yes Historical Provider, MD   Omega-3 Fatty Acids (FISH OIL) 1000 MG capsule Take 3,000 mg by mouth 2 times daily   Yes Historical Provider, MD   atorvastatin (LIPITOR) 40 MG tablet Take 1 tablet by mouth nightly 7/8/22   Jovi Andria Wells MD   XARELTO 20 MG TABS tablet TAKE 1 TABLET BY MOUTH ONCE DAILY IN THE EVENING 3/9/22   Historical Provider, MD   metoprolol succinate (TOPROL XL) 50 MG extended release tablet  3/8/22   Historical Provider, MD   clopidogrel (PLAVIX) 75 MG tablet TAKE 1 TABLET BY MOUTH ONCE DAILY 3/1/22   Historical Provider, MD   sildenafil (VIAGRA) 100 MG tablet Take 1 tablet by mouth daily as needed for Erectile Dysfunction  Patient not taking: Reported on 10/6/2022 11/16/21   Garcia Mcfarland MD       Allergies: Other    Social History:   reports that he has quit smoking. His smoking use included cigarettes. He has never used smokeless tobacco. He reports current alcohol use. Drug: Marijuana Alfornia Cashalphonso). Family History: family history is not on file. No h/o sudden cardiac death. REVIEW OF SYSTEMS:    Constitutional: there has been no unanticipated weight loss. There's been No change in energy level, No change in activity level. Eyes: No visual changes or diplopia.  No scleral icterus. ENT: No Headaches  Cardiovascular: as above  Respiratory: No previous pulmonary problems, No cough  Gastrointestinal: No abdominal pain. No change in bowel or bladder habits. Genitourinary: No dysuria, trouble voiding, or hematuria. Musculoskeletal:  No gait disturbance, No weakness or joint complaints. Integumentary: No rash or pruritis. Neurological: No headache, diplopia, change in muscle strength, numbness or tingling. No change in gait, balance, coordination, mood, affect, memory, mentation, behavior. Psychiatric: No anxiety, or depression. Endocrine: No temperature intolerance. No excessive thirst, fluid intake, or urination. No tremor. Hematologic/Lymphatic: No abnormal bruising or bleeding, blood clots or swollen lymph nodes. Allergic/Immunologic: No nasal congestion or hives. PHYSICAL EXAM:      /72   Pulse 52   Temp 96.8 °F (36 °C) (Oral)   Resp 16   Ht 6' 2\" (1.88 m)   Wt 255 lb (115.7 kg)   SpO2 96%   BMI 32.74 kg/m²    Constitutional and General Appearance: alert, cooperative, no distress and appears stated age  HEENT: PERRL, no cervical lymphadenopathy. No masses palpable. Normal oral mucosa  Respiratory:  Normal excursion and expansion without use of accessory muscles  Resp Auscultation: Good respiratory effort. No for increased work of breathing. On auscultation: clear to auscultation bilaterally  Cardiovascular: The apical impulse is not displaced  Heart tones are crisp and normal. regular S1 and S2.  Jugular venous pulsation Normal  The carotid upstroke is normal in amplitude and contour without delay or bruit  Peripheral pulses are symmetrical and full   Abdomen:   No masses or tenderness  Bowel sounds present  Extremities:   No Cyanosis or Clubbing   Lower extremity edema: No   Skin: Warm and dry  Neurological:  Alert and oriented.   Moves all extremities well  No abnormalities of mood, affect, memory, mentation, or behavior are noted      Labs:     CBC: Recent Labs     10/06/22  1329 10/07/22  0613   WBC 6.2 5.7   HGB 16.2 15.2   HCT 47.6 43.8    217     BMP:   Recent Labs     10/06/22  1329 10/07/22  0613    138   K 4.4 4.1   CO2 24 23   BUN 9 14   CREATININE 0.97 0.98   LABGLOM >60 >60   GLUCOSE 87 113*     BNP: No results for input(s): BNP in the last 72 hours. PT/INR: No results for input(s): PROTIME, INR in the last 72 hours. APTT:No results for input(s): APTT in the last 72 hours. CARDIAC ENZYMES:No results for input(s): CKTOTAL, CKMB, CKMBINDEX, TROPONINI in the last 72 hours. FASTING LIPID PANEL:  Lab Results   Component Value Date/Time    HDL 44 08/16/2021 03:07 PM    [address] 230 08/16/2021 03:07 PM    TRIG 160 08/16/2021 03:07 PM     LIVER PROFILE:  Recent Labs     10/06/22  1329 10/07/22  0613   AST 35 29   ALT 49* 41   LABALBU 4.5 3.9         Patient Active Problem List   Diagnosis    Class 1 obesity due to excess calories without serious comorbidity with body mass index (BMI) of 33.0 to 33.9 in adult    Acute pain of right shoulder    History of unstable angina    Ex-smoker    Myofascial pain    Mixed hyperlipidemia    Chronic back pain    CAD in native artery    Unstable angina (HCC)    Anxiety    S/P right coronary artery (RCA) stent placement    Vasculogenic erectile dysfunction    Persistent atrial fibrillation (HCC)    Chest pain         DATA:    IMPRESSION:      Unstable angina  CAD s/p CEE to mid RCA with moderate LAD stenosis  Afib/flutter s/p ablation in [place]   HTN  HLD    RECOMMENDATIONS:  Plan for cardiac catheterization today, keep patient n.p.o.   Further recommendations to follow cath  Continue current cardiac medications  Keep K > 4, Mg > 2        Davy Mobley MD  Cardiology Service  Internal Medicine Residency Program, PGY-1  [facility]       Attending Physician Statement  I have discussed the case of Og Chaves including pertinent history and exam findings with the resident. I have seen and examined the patient and the key elements of the encounter have been performed by me. I agree with the assessment, plan and orders as documented by the resident With changes made to the note.      Electronically signed by Emilio Bashir MD on 10/7/2022 at 1:12 PM.    [facility]      [phone]

## 2022-10-07 NOTE — H&P
[address]  CDU / OBSERVATION ENCOUNTER  RESIDENT NOTE     Pt Name: Hannah Piña  MRN: [id number]  Jensengfmanjula 1960  Date of evaluation: 10/7/22  Patient's PCP is :  Charles Murphy MD    CHIEF COMPLAINT       Chief Complaint   Patient presents with    Chest Pain         HISTORY OF PRESENT ILLNESS    Hannah Piña is a 58 y.o. male who presents with 5 days of chest pain. He says he has left-sided chest pain that does not radiate. It does not improve with rest, however does worsen with exertion. He states he has associated diaphoresis with this pain. He denies any fevers, chills,  shortness of breath, nausea, or vomiting. He states the pain worsens with exertion and when he bends over. Patient notes he does have a history of an ablation in January of this year in [place]. He is unsure what arrhythmia he had that resulted in this ablation. Location/Symptom: Chest pain  Timing/Onset: 5 days  Provocation: Exertion, bending over  Quality: Tightness, stabbing  Radiation: None  Severity: 5 out of 10  Timing/Duration: Intermittent  Modifying Factors: Exertion, movement    REVIEW OF SYSTEMS       Review of Systems   Constitutional:  Positive for diaphoresis. Negative for chills and fever. HENT:  Negative for rhinorrhea and sore throat. Eyes:  Negative for visual disturbance. Respiratory:  Negative for cough and shortness of breath. Cardiovascular:  Positive for chest pain. Negative for leg swelling. Gastrointestinal:  Negative for abdominal pain, constipation, diarrhea, nausea and vomiting. Endocrine: Negative for polyuria. Genitourinary:  Negative for dysuria, frequency and hematuria. Musculoskeletal:  Negative for arthralgias, myalgias and neck pain. Skin:  Negative for pallor. Neurological:  Negative for numbness and headaches. Psychiatric/Behavioral:  Negative for behavioral problems. (PQRS) Advance directives on face sheet per hospital policy.  No change unless specifically mentioned in chart    PAST MEDICAL HISTORY    has a past medical history of Hyperlipidemia and Hypertension. I have reviewed the past medical history with the patient and it is pertinent to this complaint. SURGICAL HISTORY      has a past surgical history that includes joint replacement (Left, 2013) and hip surgery (Right, 2013). I have reviewed and agree with Surgical History entered and it is pertinent to this complaint. CURRENT MEDICATIONS     sodium chloride flush 0.9 % injection 5-40 mL, 2 times per day  sodium chloride flush 0.9 % injection 5-40 mL, PRN  0.9 % sodium chloride infusion, PRN  ondansetron (ZOFRAN-ODT) disintegrating tablet 4 mg, Q8H PRN   Or  ondansetron (ZOFRAN) injection 4 mg, Q6H PRN  polyethylene glycol (GLYCOLAX) packet 17 g, Daily PRN  acetaminophen (TYLENOL) tablet 650 mg, Q6H PRN   Or  acetaminophen (TYLENOL) suppository 650 mg, Q6H PRN  atorvastatin (LIPITOR) tablet 40 mg, Nightly  clopidogrel (PLAVIX) tablet 75 mg, Once  rivaroxaban (XARELTO) tablet 20 mg, Daily  metoprolol succinate (TOPROL XL) extended release tablet 50 mg, Daily        All medication charted and reviewed. ALLERGIES     is allergic to other. FAMILY HISTORY     has no family status information on file. family history is not on file. The patient denies any pertinent family history. I have reviewed and agree with the family history entered. I have reviewed the Family History and it is not significant to the case    SOCIAL HISTORY      reports that he has quit smoking. His smoking use included cigarettes. He has never used smokeless tobacco. He reports current alcohol use. Drug: Marijuana Dotty Wang). I have reviewed and agree with all Social.  There are no concerns for substance abuse/use. PHYSICAL EXAM     INITIAL VITALS:  height is 6' 2\" (1.88 m) and weight is 255 lb (115.7 kg). His oral temperature is 96.8 °F (36 °C).  His blood pressure is 108/72 and his pulse is 52. His respiration is 16 and oxygen saturation is 96%. Physical Exam  Constitutional:       General: He is not in acute distress. Appearance: Normal appearance. He is not toxic-appearing. HENT:      Head: Normocephalic and atraumatic. Nose: No congestion or rhinorrhea. Mouth/Throat:      Mouth: Mucous membranes are moist.   Eyes:      Conjunctiva/sclera: Conjunctivae normal.      Pupils: Pupils are equal, round, and reactive to light. Cardiovascular:      Rate and Rhythm: Normal rate and regular rhythm. Pulses: Normal pulses. Heart sounds: No murmur heard. Pulmonary:      Effort: Pulmonary effort is normal. No respiratory distress. Breath sounds: Normal breath sounds. No wheezing. Abdominal:      General: Bowel sounds are normal. There is no distension. Palpations: Abdomen is soft. Tenderness: There is no abdominal tenderness. There is no guarding or rebound. Musculoskeletal:      Right lower leg: No edema. Left lower leg: No edema. Skin:     General: Skin is warm and dry. Neurological:      Mental Status: He is alert and oriented to person, place, and time. Psychiatric:         Mood and Affect: Mood normal.         Behavior: Behavior normal.         Judgment: Judgment normal.           DIFFERENTIAL DIAGNOSIS/MDM:     DDx: Anginal chest pain    DIAGNOSTIC RESULTS       RADIOLOGY:   I directly visualized the following  images and reviewed the radiologist interpretations:    XR CHEST (2 VW)    Result Date: 10/6/2022  EXAMINATION: TWO XRAY VIEWS OF THE CHEST 10/6/2022 10:45 am COMPARISON: 10/03/2022 HISTORY: ORDERING SYSTEM PROVIDED HISTORY: Chest Pain TECHNOLOGIST PROVIDED HISTORY: Chest Pain FINDINGS: The lungs are without acute focal process. There is no effusion or pneumothorax. The cardiomediastinal silhouette is stable. The osseous structures are stable. No acute process.        LABS:  I have reviewed and interpreted all available lab results. Labs Reviewed   COMPREHENSIVE METABOLIC PANEL - Abnormal; Notable for the following components:       Result Value    Alkaline Phosphatase 165 (*)     ALT 49 (*)     All other components within normal limits   LIPASE - Abnormal; Notable for the following components:    Lipase 83 (*)     All other components within normal limits   CBC WITH AUTO DIFFERENTIAL - Abnormal; Notable for the following components:    Monocytes 17 (*)     All other components within normal limits   COMPREHENSIVE METABOLIC PANEL - Abnormal; Notable for the following components:    Glucose 113 (*)     Alkaline Phosphatase 142 (*)     All other components within normal limits   COVID-19, RAPID   CBC   TROPONIN   TROPONIN   MAGNESIUM   TROPONIN       SCREENING TOOLS:    HEART Risk Score for Chest Pain Patients  History and Physical Exam Suspicion Level  (Nausea, Vomiting, Diaphoresis, Radiation, Exertion)  Slightly Suspicious (0 pts)  Moderately Suspicious (1 pt)  Highly Suspicious (2 pts)  EKG Interpretation  Normal (0 pts)  Non-Specific Repolarization Disturbance (1 pt)  Significant ST-Depression (2 pts)  Age of Patient (in years)  = 39 (0 pts)  46-64 (1 pt)  = 65 (2 pts)  Risk Factors  No Risk Factors (0 pts)  1-2 Risk Factors (1 pt)  = 3 Risk Factors (2 pts)  Risk Factors Include:  Hypercholesterolemia  Hypertension  Diabetes Mellitus  Cigarette smoking  Positive family history  Obesity  CAD  (SLE, CKDz, HIV, Cocaine abuse)  Troponin Levels  = Normal Limit (0 pts)  1-3 Times Normal Limit (1 pt)  > 3 Times Normal Limit (2 pts)  TOTAL:    Percent Risk for Major Adverse Cardiac Event (MACE)  0-3 pts indicates low risk for MACE   2.5% (DISCHARGE)   4-7 pts indicates moderate risk for MACE  20.3% (OBS)  8-10 pts indicates high risk for MACE  72.7% (EARLY INVASIVE TX)    CDU IMPRESSION / PLAN      Kristin Barrios is a 58 y.o. male who presents with chest pain.     Chest pain  Patient has a history of ACS, status post stent placement presenting with typical anginal chest pain. Cardiology recommends catheterization this morning, disposition pending catheterization  2. Continue home medications and pain control  3. Monitor vitals, labs, and imaging  4. DISPO: pending consults and clinical improvement    CONSULTS:    IP CONSULT TO CARDIOLOGY    PROCEDURES:  Not indicated       PATIENT REFERRED TO:    No follow-up provider specified. --  Aurora Jaramillo DO   Emergency Medicine Resident     This dictation was generated by voice recognition computer software. Although all attempts are made to edit the dictation for accuracy, there may be errors in the transcription that are not intended.

## 2022-10-07 NOTE — DISCHARGE INSTR - COC
Continuity of Care Form    Patient Name: Dat Staley   :  1960  MRN:  [id number]    Admit date:  10/6/2022  Discharge date:  ***    Code Status Order: Full Code   Advance Directives:     Admitting Physician:  Warner Landau, MD  PCP: Stephen Argueta MD    Discharging Nurse: [facility] Unit/Room#: 0514/7609-71  Discharging Unit Phone Number: ***    Emergency Contact:   Extended Emergency Contact Information  Primary Emergency Contact: NASIR CARCAMO  Mobile Phone: [phone]  Relation: Other    Past Surgical History:  Past Surgical History:   Procedure Laterality Date    HIP SURGERY Right 2013    JOINT REPLACEMENT Left 2013       Immunization History:   Immunization History   Administered Date(s) Administered    COVID-19, J&J, (age 18y+), IM, 0.5 mL 2021    COVID-19, PFIZER PURPLE top, DILUTE for use, (age 15 y+), 30mcg/0.3mL 2021       Active Problems:  Patient Active Problem List   Diagnosis Code    Class 1 obesity due to excess calories without serious comorbidity with body mass index (BMI) of 33.0 to 33.9 in adult E66.09, Z68.33    Acute pain of right shoulder M25.511    History of unstable angina Z86.79    Ex-smoker Z87.891    Myofascial pain M79.18    Mixed hyperlipidemia E78.2    Chronic back pain M54.9, G89.29    CAD in native artery I25.10    Unstable angina (HCC) I20.0    Anxiety F41.9    S/P right coronary artery (RCA) stent placement Z95.5    Vasculogenic 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    Persistent atrial fibrillation ([address] I48.19    Chest pain R07.9       Isolation/Infection:   Isolation            No Isolation          Patient Infection Status       None to display            Nurse Assessment:  Last Vital Signs: BP (!) 147/90   Pulse 56   Temp 98.3 °F (36.8 °C) (Oral)   Resp 16   Ht 6' 2\" (1.88 m)   Wt 255 lb (115.7 kg)   SpO2 97%   BMI 32.74 kg/m²     Last documented pain score (0-10 scale): Pain Level: 0  Last Weight:   Wt Readings from Last 1 Encounters: 10/06/22 255 lb (115.7 kg)     Mental Status:  {IP PT MENTAL STATUS:20773}    IV Access:  { BROOK IV ACCESS:092740347}    Nursing Mobility/ADLs:  Walking   {CHP DME GECT:420753891}  Transfer  {CHP DME QSEZ:695764392}  Bathing  {CHP DME NPOZ:554919353}  Dressing  {CHP DME VIDD:006650290}  Toileting  {CHP DME VNXB:460145331}  Feeding  {CHP DME JWVU:982882851}  Med Admin  {CHP DME OPYS:483295397}  Med Delivery   { BROOK MED Delivery:462571560}    Wound Care Documentation and Therapy:        Elimination:  Continence: Bowel: {YES / Q}  Bladder: {YES / AA:05055}  Urinary Catheter: {Urinary Catheter:447075128}   Colostomy/Ileostomy/Ileal Conduit: {YES / ZB:68936}       Date of Last BM: ***  No intake or output data in the 24 hours ending 10/07/22 1440  No intake/output data recorded.     Safety Concerns:     [address] [employer] Safety Concerns:638759366}    Impairments/Disabilities:      [address] Impairments/Disabilities:545316678}    Nutrition Therapy:  Current Nutrition Therapy:   [employer] Diet List:752310645}    Routes of Feeding: {P DME Other Feedings:188255322}  Liquids: {Slp liquid thickness:37435}  Daily Fluid Restriction: {CHP DME Yes amt example:947336965}  Last Modified Barium Swallow with Video (Video Swallowing Test): {Done Not Done LGBW:101329169}    Treatments at the Time of Hospital Discharge:   Respiratory Treatments: ***  Oxygen Therapy:  {Therapy; copd oxygen:14481}  Ventilator:    {[facility] Vent RAVF:365460812}    Rehab Therapies: {THERAPEUTIC INTERVENTION:6179157949}  Weight Bearing Status/Restrictions: [employer] Weight Bearin}  Other Medical Equipment (for information only, NOT a DME order):  {EQUIPMENT:380467213}  Other Treatments: ***    Patient's personal belongings (please select all that are sent with patient):  {[facility] DME Belongings:599038479}    RN SIGNATURE:  {Esignature:819034421}    CASE MANAGEMENT/SOCIAL WORK SECTION    Inpatient Status Date: ***    Readmission Risk Assessment Score:  Readmission Risk              Risk of Unplanned Readmission:  0           Discharging to Facility/ Agency   Name:   Address:  Phone:  Fax:    Dialysis Facility (if applicable)   Name:  Address:  Dialysis Schedule:  Phone:  Fax:    / signature: {Esignature:910291881}    PHYSICIAN SECTION    Prognosis:     Condition at Discharge:     Rehab Potential (if transferring to Rehab):     Recommended Labs or Other Treatments After Discharge:    Physician Certification: I certify the above information and transfer of Melissa Tovar  is necessary for the continuing treatment of the diagnosis listed and that he requires  for greater 30 days.      Update Admission H&P: No change in H&P    PHYSICIAN SIGNATURE:

## 2022-10-07 NOTE — CARE COORDINATION
10/07/22 1255   Service Assessment   Patient Orientation Alert and Oriented   History Provided By Patient   PCP Verified by CM Yes  Tracy Alexis MD)   Last Visit to PCP Within last 6 months   Current Functional Level Independent in ADLs/IADLs   Social/Functional History   Lives With Alone   Discharge Planning   Patient expects to be discharged to: [facility]   Confirm Follow Up Transport Self     Plan return home independently

## 2022-10-07 NOTE — OP NOTE
[facility]  Cardiac catheterization note        Date:   10/7/2022  Patient name: Edson Smith  Date of admission:  10/6/2022  1:11 PM  MRN:   [id number]  YOB: 1960    Operators:  Gustavo Card MD                                      Pre Procedure Conscious Sedation Data:    ASA Class:    [] I [x] II [] III [] IV    Mallampati Class:  [] I [x] II [] III [] IV      Indications:  Recurrent angina   H/o CAD with PCI     Procedure:   Left heart catheterization   Selective left and right coronary angiography   Left ventriculography   IFR of LAD       Access:  [] Femoral  [x] Radial  artery       [] Right  [x] Left    Procedure: After informed consent was obtained with explanation of the risks and benefits, patient was brought to the cath lab. The access area was prepped and draped in sterile fashion. 1% lidocaine was used for local block. The artery was cannulated with 6  Fr sheath with brisk arterial blood return. The side port was frequently flushed and aspirated with normal saline. Cardiac Arteries and Lesion Findings       LMCA: Normal 0% stenosis. LAD: Mid 50% eccentric stenosis, same as on last angiogram in 11/2021, IFr was performed which was 0.95. Lesion on Mid LAD:         Devices used         - Verrata Pressure Wire . 014. Number of passes: 1. LCx: Normal 0% stenosis. OM is large and patent with mild disease     RCA: Patent stent in the mid segment   RPDA/RPL are large and normal      Coronary Tree        Dominance: Right       LV Analysis   LV function assessed as:Normal.   Ejection Fraction   +----------------------------------------------------------------------+---+   ! Method                                                                ! EF%! +----------------------------------------------------------------------+---+   ! LV gram                                                               !50 ! +----------------------------------------------------------------------+---+      Procedure Summary        1. Widely patent mid RCA stent    2. Intermediate stenosis in mid LAD, same as on last angiogram, negative IFR of 0.95    3. Normal LV systolic function. Recommendations        Routine Post Diagnostic Cath orders. Optimize antianginal therapy. Add Imdur. Resume plavix and Xarelto    Risk factor modification.        Estimated Blood Loss:            [x] Minimal 10 cc   [] Minimal < 25 cc      [] Moderate 25-50 cc         [] >50 cc      Electronically signed by Enrique Gore MD on 10/7/2022 at 11:42 AM  Cardiovascular fellow  [address]      Attending Physician    Mary Farah MD, Diana Gusman

## 2022-10-08 LAB
EKG ATRIAL RATE: 55 BPM
EKG ATRIAL RATE: 61 BPM
EKG P AXIS: 20 DEGREES
EKG P AXIS: 61 DEGREES
EKG P-R INTERVAL: 206 MS
EKG P-R INTERVAL: 210 MS
EKG Q-T INTERVAL: 432 MS
EKG Q-T INTERVAL: 464 MS
EKG QRS DURATION: 86 MS
EKG QRS DURATION: 94 MS
EKG QTC CALCULATION (BAZETT): 434 MS
EKG QTC CALCULATION (BAZETT): 443 MS
EKG R AXIS: -12 DEGREES
EKG R AXIS: -13 DEGREES
EKG T AXIS: -3 DEGREES
EKG T AXIS: 9 DEGREES
EKG VENTRICULAR RATE: 55 BPM
EKG VENTRICULAR RATE: 61 BPM

## 2022-10-08 NOTE — PROGRESS NOTES
[facility]  CDU / OBSERVATION ENCOUNTER  ATTENDING NOTE       I performed a history and physical examination of the patient and discussed management with the resident or midlevel provider. I reviewed the resident or midlevel provider's note and agree with the documented findings and plan of care. Any areas of disagreement are noted on the chart. I was personally present for the key portions of any procedures. I have documented in the chart those procedures where I was not present during the key portions. I have reviewed the nurses notes. I agree with the chief complaint, past medical history, past surgical history, allergies, medications, social and family history as documented unless otherwise noted below. The Family history, social history, and ROS are effectively unchanged since admission unless noted elsewhere in the chart. Patient seen by cardiology and taken to cardiac catheterization lab. Patient did well with cardiac catheterization which did not require stenting. Patient came back to the unit and was treated along the post-cath protocol. Imdur was added to his current regimen.   Patient was discharged in good condition after recovery from Cath Lab    Laura Mcfarland MD  Attending Emergency  Physician

## 2022-10-09 NOTE — DISCHARGE SUMMARY
CDU Discharge Summary        Patient:  Kristin Barrios  YOB: 1960    MRN: [id number]   Acct: [de-identified]    Primary Care Physician: Susan Chester MD    Admit date:  10/6/2022  1:11 PM  Discharge date: 10/7/2022  5:08 PM     Discharge Diagnoses:     Acute chest pain due to unknown cause  Improved with analgesia. Follow-up:  Call today/tomorrow for a follow up appointment with Susan Chester MD , or return to the Emergency Room with worsening symptoms    Stressed to patient the importance of following up with primary care doctor for further workup/management of symptoms. Pt verbalizes understanding and agrees with plan. Discharge Medications:  Changes to medications addition of imdur.            Medication List        START taking these medications      isosorbide mononitrate 30 MG extended release tablet  Commonly known as: IMDUR  Take 1 tablet by mouth daily            CONTINUE taking these medications      atorvastatin 40 MG tablet  Commonly known as: LIPITOR  Take 1 tablet by mouth nightly     clopidogrel 75 MG tablet  Commonly known as: PLAVIX     dilTIAZem 120 MG extended release capsule  Commonly known as: CARDIZEM 12 HR     fish oil 1000 MG capsule     metoprolol succinate 50 MG extended release tablet  Commonly known as: TOPROL XL     Xarelto 20 MG Tabs tablet  Generic drug: rivaroxaban            STOP taking these medications      sildenafil 100 MG tablet  Commonly known as: VIAGRA               Where to Get Your Medications        You can get these medications from any pharmacy    Bring a paper prescription for each of these medications  isosorbide mononitrate 30 MG extended release tablet         Diet:  No diet orders on file , Advance as tolerated     Activity:  As tolerated    Consultants: IP CONSULT TO CARDIOLOGY    Procedures:  diagnostic heart catheterization    Diagnostic Test:   Results for orders placed or performed during the hospital encounter of 10/06/22   COVID-19, Rapid    Specimen: Nasopharyngeal Swab   Result Value Ref Range    Specimen Description . NASOPHARYNGEAL SWAB     SARS-CoV-2, Rapid Not Detected Not Detected   CBC   Result Value Ref Range    WBC 6.2 3.5 - 11.3 k/uL    RBC 4.95 4.21 - 5.77 m/uL    Hemoglobin 16.2 13.0 - 17.0 g/dL    Hematocrit 47.6 40.7 - 50.3 %    MCV 96.2 82.6 - 102.9 fL    MCH 32.7 25.2 - 33.5 pg    MCHC 34.0 28.4 - 34.8 g/dL    RDW 12.3 11.8 - 14.4 %    Platelets 326 059 - 094 k/uL    MPV 9.6 8.1 - 13.5 fL    NRBC Automated 0.0 0.0 per 100 WBC   Comprehensive Metabolic Panel   Result Value Ref Range    Glucose 87 70 - 99 mg/dL    BUN 9 8 - 23 mg/dL    Creatinine 0.97 0.70 - 1.20 mg/dL    Est, Glom Filt Rate >60 >60 mL/min/1.73m2    Calcium 9.2 8.6 - 10.4 mg/dL    Sodium 140 135 - 144 mmol/L    Potassium 4.4 3.7 - 5.3 mmol/L    Chloride 103 98 - 107 mmol/L    CO2 24 20 - 31 mmol/L    Anion Gap 13 9 - 17 mmol/L    Alkaline Phosphatase 165 (H) 40 - 129 U/L    ALT 49 (H) 5 - 41 U/L    AST 35 <40 U/L    Total Bilirubin 0.4 0.3 - 1.2 mg/dL    Total Protein 7.8 6.4 - 8.3 g/dL    Albumin 4.5 3.5 - 5.2 g/dL    Albumin/Globulin Ratio 1.4 1.0 - 2.5   Troponin   Result Value Ref Range    Troponin, High Sensitivity 10 0 - 22 ng/L   Troponin   Result Value Ref Range    Troponin, High Sensitivity 8 0 - 22 ng/L   Magnesium   Result Value Ref Range    Magnesium 2.2 1.6 - 2.6 mg/dL   Lipase   Result Value Ref Range    Lipase 83 (H) 13 - 60 U/L   CBC with Auto Differential   Result Value Ref Range    WBC 5.7 3.5 - 11.3 k/uL    RBC 4.66 4.21 - 5.77 m/uL    Hemoglobin 15.2 13.0 - 17.0 g/dL    Hematocrit 43.8 40.7 - 50.3 %    MCV 94.0 82.6 - 102.9 fL    MCH 32.6 25.2 - 33.5 pg    MCHC 34.7 28.4 - 34.8 g/dL    RDW 12.4 11.8 - 14.4 %    Platelets 211 342 - 677 k/uL    MPV 9.5 8.1 - 13.5 fL    NRBC Automated 0.0 0.0 per 100 WBC    Seg Neutrophils 48 36 - 65 %    Lymphocytes 30 24 - 43 %    Monocytes 17 (H) 3 - 12 %    Eosinophils % 4 1 - 4 %    Basophils 1 0 - 2 % Immature Granulocytes 0 0 %    Segs Absolute 2.69 1.50 - 8.10 k/uL    Absolute Lymph # 1.68 1.10 - 3.70 k/uL    Absolute Mono # 0.98 0.10 - 1.20 k/uL    Absolute Eos # 0.22 0.00 - 0.44 k/uL    Basophils Absolute 0.07 0.00 - 0.20 k/uL    Absolute Immature Granulocyte <0.03 0.00 - 0.30 k/uL   Comprehensive Metabolic Panel   Result Value Ref Range    Glucose 113 (H) 70 - 99 mg/dL    BUN 14 8 - 23 mg/dL    Creatinine 0.98 0.70 - 1.20 mg/dL    Est, Glom Filt Rate >60 >60 mL/min/1.73m2    Calcium 8.7 8.6 - 10.4 mg/dL    Sodium 138 135 - 144 mmol/L    Potassium 4.1 3.7 - 5.3 mmol/L    Chloride 106 98 - 107 mmol/L    CO2 23 20 - 31 mmol/L    Anion Gap 9 9 - 17 mmol/L    Alkaline Phosphatase 142 (H) 40 - 129 U/L    ALT 41 5 - 41 U/L    AST 29 <40 U/L    Total Bilirubin 0.3 0.3 - 1.2 mg/dL    Total Protein 6.9 6.4 - 8.3 g/dL    Albumin 3.9 3.5 - 5.2 g/dL    Albumin/Globulin Ratio 1.3 1.0 - 2.5   Troponin   Result Value Ref Range    Troponin, High Sensitivity 10 0 - 22 ng/L   EKG 12 Lead   Result Value Ref Range    Ventricular Rate 61 BPM    Atrial Rate 61 BPM    P-R Interval 210 ms    QRS Duration 94 ms    Q-T Interval 432 ms    QTc Calculation (Bazett) 434 ms    P Axis 20 degrees    R Axis -13 degrees    T Axis -3 degrees   EKG 12 Lead   Result Value Ref Range    Ventricular Rate 55 BPM    Atrial Rate 55 BPM    P-R Interval 206 ms    QRS Duration 86 ms    Q-T Interval 464 ms    QTc Calculation (Bazett) 443 ms    P Axis 61 degrees    R Axis -12 degrees    T Axis 9 degrees     XR CHEST (2 VW)    Result Date: 10/6/2022  EXAMINATION: TWO XRAY VIEWS OF THE CHEST 10/6/2022 10:45 am COMPARISON: 10/03/2022 HISTORY: ORDERING SYSTEM PROVIDED HISTORY: Chest Pain TECHNOLOGIST PROVIDED HISTORY: Chest Pain FINDINGS: The lungs are without acute focal process. There is no effusion or pneumothorax. The cardiomediastinal silhouette is stable. The osseous structures are stable. No acute process.      XR CHEST PORTABLE    Result Date: 10/3/2022  EXAMINATION: ONE XRAY VIEW OF THE CHEST 10/3/2022 11:22 am COMPARISON: None. HISTORY: ORDERING SYSTEM PROVIDED HISTORY: cp TECHNOLOGIST PROVIDED HISTORY: cp Reason for Exam: chest pain FINDINGS: Cardiomediastinal silhouette is within normal limits. No significant pulmonary edema. No lung consolidation or infiltrate. No pleural effusion or pneumothorax. Osseous structures grossly intact. Portable frontal chest is within normal limits. Physical Exam:    General appearance - NAD, AOx 3   Lungs -CTAB, no R/R/R  Heart - RRR, no M/R/G  Abdomen - Soft, NT/ND  Neurological:  MAEx4, No focal motor deficit, sensory loss  Extremities - Cap refil <2 sec in all ext., no edema  Skin -warm, dry      Hospital Course:  Clinical course has improved, labs and imaging reviewed. Dat Staley originally presented to the hospital on 10/6/2022  1:11 PM. with chest pain. At that time it was determined that He required further observation and cardiology consultation. He was admitted and labs and imaging were followed daily. Imaging results as above. He is medically stable to be discharged. Disposition: Home    Patient stated that they will not drive themselves home from the hospital if they have gotten pain killers/ narcotics earlier that day and that they will arrange for transportation on their own or work with the  for a ride. Patient counseled NOT to drive while under the influence of narcotics/ pain killers. Condition: Good    Patient stable and ready for discharge home. I have discussed plan of care with patient and they are in understanding. They were instructed to read discharge paperwork. All of their questions and concerns were addressed. Time Spent: 0 day      --  Early Blizzard, DO  Emergency Medicine Resident Physician    This dictation was generated by voice recognition computer software.   Although all attempts are made to edit the dictation for accuracy, there may be errors in the transcription that are not intended.

## 2022-10-10 ENCOUNTER — CARE COORDINATION (OUTPATIENT)
Dept: CARE COORDINATION | Age: 62
End: 2022-10-10

## 2022-10-10 NOTE — CARE COORDINATION
Care Transitions Outreach Attempt #1    Call within 2 business days of discharge: Yes   Attempted to reach patient for transitions of care follow up. Unable to reach patient. HIPAA compliant message left on  requesting a return call. Patient: Cas Kelly Patient : 1960 MRN: [id number]    Last Discharge  Street       Date Complaint Diagnosis Description Type Department Provider    10/6/22 Chest Pain Chest pain, unspecified type . .. ED to Hosp-Admission (Discharged) (ADMITTED) EPIFANIO Rodriguez MD; Theresa Adam Hy. .. Was this an external facility discharge?  No Discharge Facility: [facility]    Noted following upcoming appointments from discharge chart review:   [facility] follow up appointment(s):   Future Appointments   Date Time Provider Alexandrea Simmons   10/12/2022  3:00 PM Robert Tovar MD  [place] [address] Health/ Care Transition Nurse  [phone]

## 2022-10-11 ENCOUNTER — CARE COORDINATION (OUTPATIENT)
Dept: CARE COORDINATION | Age: 62
End: 2022-10-11

## 2022-10-11 NOTE — CARE COORDINATION
Care Transitions Outreach Attempt #2 final    Call within 2 business days of discharge: Yes   Attempted to reach patient for transitions of care follow up. Unable to reach patient. HIPAA compliant message left on  requesting a return call. Final attempt, episode resolved. Patient: Amina Boswell Patient : 1960 MRN: [id number]    Last Discharge [address]       Date Complaint Diagnosis Description Type Department Provider    10/6/22 Chest Pain Chest pain, unspecified type . .. ED to Hosp-Admission (Discharged) (ADMITTED) STVZ Palmira Hashimoto, MD; Gala Pascual Hy. .. Was this an external facility discharge?  No Discharge Facility: [facility]    Noted following upcoming appointments from discharge chart review:   [facility] follow up appointment(s):   Future Appointments   Date Time Provider Alexandrea Simmons   10/12/2022  3:00 PM Jo Jasso  [place] [id number] Health/ Care Transition Nurse  [phone]

## 2022-10-12 ENCOUNTER — OFFICE VISIT (OUTPATIENT)
Dept: INTERNAL MEDICINE CLINIC | Age: 62
End: 2022-10-12
Payer: COMMERCIAL

## 2022-10-12 VITALS
DIASTOLIC BLOOD PRESSURE: 62 MMHG | BODY MASS INDEX: 33.88 KG/M2 | WEIGHT: 264 LBS | SYSTOLIC BLOOD PRESSURE: 134 MMHG | HEIGHT: 74 IN | OXYGEN SATURATION: 97 % | HEART RATE: 69 BPM

## 2022-10-12 DIAGNOSIS — I20.0 UNSTABLE ANGINA (HCC): Primary | ICD-10-CM

## 2022-10-12 DIAGNOSIS — E66.09 CLASS 1 OBESITY DUE TO EXCESS CALORIES WITHOUT SERIOUS COMORBIDITY WITH BODY MASS INDEX (BMI) OF 33.0 TO 33.9 IN ADULT: ICD-10-CM

## 2022-10-12 DIAGNOSIS — I25.10 CAD IN NATIVE ARTERY: ICD-10-CM

## 2022-10-12 DIAGNOSIS — E78.2 MIXED HYPERLIPIDEMIA: ICD-10-CM

## 2022-10-12 DIAGNOSIS — F41.9 ANXIETY: ICD-10-CM

## 2022-10-12 DIAGNOSIS — I48.19 PERSISTENT ATRIAL FIBRILLATION (HCC): ICD-10-CM

## 2022-10-12 DIAGNOSIS — Z09 HOSPITAL DISCHARGE FOLLOW-UP: ICD-10-CM

## 2022-10-12 PROCEDURE — 1036F TOBACCO NON-USER: CPT | Performed by: INTERNAL MEDICINE

## 2022-10-12 PROCEDURE — 1111F DSCHRG MED/CURRENT MED MERGE: CPT | Performed by: INTERNAL MEDICINE

## 2022-10-12 PROCEDURE — G8417 CALC BMI ABV UP PARAM F/U: HCPCS | Performed by: INTERNAL MEDICINE

## 2022-10-12 PROCEDURE — 99214 OFFICE O/P EST MOD 30 MIN: CPT | Performed by: INTERNAL MEDICINE

## 2022-10-12 PROCEDURE — G8427 DOCREV CUR MEDS BY ELIG CLIN: HCPCS | Performed by: INTERNAL MEDICINE

## 2022-10-12 PROCEDURE — G8484 FLU IMMUNIZE NO ADMIN: HCPCS | Performed by: INTERNAL MEDICINE

## 2022-10-12 PROCEDURE — 3017F COLORECTAL CA SCREEN DOC REV: CPT | Performed by: INTERNAL MEDICINE

## 2022-10-12 RX ORDER — DILTIAZEM HYDROCHLORIDE 120 MG/1
CAPSULE, EXTENDED RELEASE ORAL
COMMUNITY
Start: 2022-09-28

## 2022-10-12 ASSESSMENT — PATIENT HEALTH QUESTIONNAIRE - PHQ9
SUM OF ALL RESPONSES TO PHQ QUESTIONS 1-9: 0
2. FEELING DOWN, DEPRESSED OR HOPELESS: 0
SUM OF ALL RESPONSES TO PHQ QUESTIONS 1-9: 0
SUM OF ALL RESPONSES TO PHQ9 QUESTIONS 1 & 2: 0
1. LITTLE INTEREST OR PLEASURE IN DOING THINGS: 0

## 2022-10-12 ASSESSMENT — ENCOUNTER SYMPTOMS
SHORTNESS OF BREATH: 0
EYE PAIN: 0
ABDOMINAL DISTENTION: 0
COLOR CHANGE: 0
DIARRHEA: 0
COUGH: 0
TROUBLE SWALLOWING: 0
BLOOD IN STOOL: 0
EYE DISCHARGE: 0
WHEEZING: 0

## 2022-10-12 NOTE — PROGRESS NOTES
Visit Information    Have you changed or started any medications since your last visit including any over-the-counter medicines, vitamins, or herbal medicines? no   Are you having any side effects from any of your medications? -  no  Have you stopped taking any of your medications? Is so, why? -  no    Have you seen any other physician or provider since your last visit? Yes - Records Obtained  Have you had any other diagnostic tests since your last visit? Yes - Records Obtained  Have you been seen in the emergency room and/or had an admission to a hospital since we last saw you? Yes - Records Obtained  Have you had your routine dental cleaning in the past 6 months? no    Have you activated your [employer] account? If not, what are your barriers?  Yes     Patient Care Team:  Stormy Davis MD as PCP - General (Internal Medicine)  Stormy Davis MD as PCP - [facility] Provider    Medical History Review  Past Medical, Family, and Social History reviewed and does contribute to the patient presenting condition    Health Maintenance   Topic Date Due    HIV screen  Never done    Hepatitis C screen  Never done    DTaP/Tdap/Td vaccine (1 - Tdap) Never done    Colorectal Cancer Screen  Never done    Shingles vaccine (1 of 2) Never done    COVID-19 Vaccine (3 - Booster for Zaid series) 04/26/2022    Depression Screen  06/16/2022    Flu vaccine (1) Never done    Lipids  08/16/2022    Diabetes screen  08/16/2024    Hepatitis A vaccine  Aged Out    Hib vaccine  Aged Out    Meningococcal (ACWY) vaccine  Aged Out    Pneumococcal 0-64 years Vaccine  Aged Out

## 2022-10-12 NOTE — PROGRESS NOTES
[address] [address]  Dept: [phone]  Dept Fax: [phone]    Cas Kelly is a 58 y.o. male who presents today for his medical conditions/complaintsas noted below. Cas Kelly is c/o of   Chief Complaint   Patient presents with    Chest Pain     Patient went in to [facility] [place]. Vs and he was there from 10/6-10/7         HPI:     HTN  Onset more than 2 years ago  cheng mild to mod  Controlled with current po meds  Not associated with headaches or blurry vision  No chest pain    Afib  Post ablation        Hemoglobin A1C (%)   Date Value   08/16/2021 5.4             ( goal A1Cis < 7)   No results found for: LABMICR  LDL Calculated (mg/dL)   Date Value   08/16/2021 230 (H)   08/06/2020 100   08/06/2020 100       (goal LDL is <100)   AST (U/L)   Date Value   10/07/2022 29     ALT (U/L)   Date Value   10/07/2022 41     BUN (mg/dL)   Date Value   10/07/2022 14     BP Readings from Last 3 Encounters:   10/12/22 134/62   10/07/22 (!) 147/90   10/03/22 (!) 155/103          (goal 120/80)    Past Medical History:   Diagnosis Date    Hyperlipidemia     Hypertension       Past Surgical History:   Procedure Laterality Date    HIP SURGERY Right 2013    JOINT REPLACEMENT Left 2013       No family history on file.     Social History     Tobacco Use    Smoking status: Former     Types: Cigarettes    Smokeless tobacco: Never   Substance Use Topics    Alcohol use: Yes     Comment: 4or more drinks daily      Current Outpatient Medications   Medication Sig Dispense Refill    DILT- MG extended release capsule       isosorbide mononitrate (IMDUR) 30 MG extended release tablet Take 1 tablet by mouth daily 30 tablet 3    dilTIAZem (CARDIZEM 12 HR) 120 MG extended release capsule Take 120 mg by mouth daily      Omega-3 Fatty Acids (FISH OIL) 1000 MG capsule Take 3,000 mg by mouth 2 times daily      atorvastatin (LIPITOR) 40 MG tablet Take 1 tablet by mouth nightly 30 tablet 0    XARELTO 20 MG TABS tablet TAKE 1 TABLET BY MOUTH ONCE DAILY IN THE EVENING      metoprolol succinate (TOPROL XL) 50 MG extended release tablet       clopidogrel (PLAVIX) 75 MG tablet TAKE 1 TABLET BY MOUTH ONCE DAILY       No current facility-administered medications for this visit. Allergies   Allergen Reactions    Other      Allergy: Hayfever        Health Maintenance   Topic Date Due    HIV screen  Never done    Hepatitis C screen  Never done    DTaP/Tdap/Td vaccine (1 - Tdap) Never done    Colorectal Cancer Screen  Never done    Shingles vaccine (1 of 2) Never done    COVID-19 Vaccine (3 - Booster for Zaid series) 04/26/2022    Depression Screen  06/16/2022    Flu vaccine (1) Never done    Lipids  08/16/2022    Diabetes screen  08/16/2024    Hepatitis A vaccine  Aged Out    Hib vaccine  Aged Out    Meningococcal (ACWY) vaccine  Aged Out    Pneumococcal 0-64 years Vaccine  Aged Out       Subjective:     Review of Systems   Constitutional:  Negative for appetite change, diaphoresis and fatigue. HENT:  Negative for ear discharge and trouble swallowing. Eyes:  Negative for pain and discharge. Respiratory:  Negative for cough, shortness of breath and wheezing. Cardiovascular:  Negative for chest pain and palpitations. Gastrointestinal:  Negative for abdominal distention, blood in stool and diarrhea. Endocrine: Negative for polydipsia and polyphagia. Genitourinary:  Negative for difficulty urinating and frequency. Musculoskeletal:  Negative for gait problem, myalgias and neck pain. Skin:  Negative for color change and rash. Allergic/Immunologic: Negative for environmental allergies and food allergies. Neurological:  Negative for dizziness and headaches. Hematological:  Negative for adenopathy. Does not bruise/bleed easily. Psychiatric/Behavioral:  Negative for behavioral problems and sleep disturbance.       Objective:     Physical Exam  Constitutional:       Appearance: He is aggressive. Thought Content: Thought content does not include homicidal ideation. Cognition and Memory: Memory is not impaired. /62   Pulse 69   Ht 6' 2\" (1.88 m)   Wt 264 lb (119.7 kg)   SpO2 97%   BMI 33.90 kg/m²     Assessment:       Diagnosis Orders   1. Unstable angina (HCC)        2. Persistent atrial fibrillation ([address]        3. Anxiety        4. CAD in native artery        5. Mixed hyperlipidemia        6. Class 1 obesity due to excess calories without serious comorbidity with body mass index (BMI) of 33.0 to 33.9 in adult                  Plan:      No follow-ups on file. No orders of the defined types were placed in this encounter. No orders of the defined types were placed in this encounter. Post afib ablation at [facility] cath rca stent  Sees dr Jinny Zavala  Pt to see him      Patient given educational materials - see patient instructions. Discussed use, benefit, and side effects of prescribed medications. All patientquestions answered. Pt voiced understanding. Reviewed health maintenance. Instructedto continue current medications, diet and exercise. Patient agreed with treatmentplan. Follow up as directed. Please note that this chart was generated using voice recognition Dragon dictation software. Although every effort was made to ensure the accuracy of this automated transcription, some errors in transcription may have occurred.      Electronically signed by Stormy Davis MD on 10/12/2022 at 4:01 PM

## 2023-02-03 LAB
CHOLESTEROL/HDL RATIO: 3.4 RATIO
CHOLESTEROL: 168 MG/DL
HDLC SERPL-MCNC: 49 MG/DL
LDL CHOLESTEROL CALCULATED: 91 MG/DL
LDL/HDL RATIO: 1.9 RATIO
TRIGL SERPL-MCNC: 142 MG/DL
VLDLC SERPL CALC-MCNC: 28 MG/DL

## 2023-03-27 RX ORDER — CLOPIDOGREL BISULFATE 75 MG/1
TABLET ORAL
Qty: 30 TABLET | Refills: 0 | OUTPATIENT
Start: 2023-03-27

## 2023-04-04 RX ORDER — CLOPIDOGREL BISULFATE 75 MG/1
TABLET ORAL
Qty: 30 TABLET | Refills: 0 | OUTPATIENT
Start: 2023-04-04

## 2023-11-22 ENCOUNTER — TELEPHONE (OUTPATIENT)
Dept: INTERNAL MEDICINE CLINIC | Age: 63
End: 2023-11-22

## 2023-11-22 NOTE — TELEPHONE ENCOUNTER
Patient called stating he is having really bad pain and not quite sure why.  I advised patient to go to urgent care or er as we have no available appts